# Patient Record
Sex: FEMALE | Employment: UNEMPLOYED | URBAN - METROPOLITAN AREA
[De-identification: names, ages, dates, MRNs, and addresses within clinical notes are randomized per-mention and may not be internally consistent; named-entity substitution may affect disease eponyms.]

---

## 2021-10-04 ENCOUNTER — HOSPITAL ENCOUNTER (OUTPATIENT)
Dept: GENERAL RADIOLOGY | Age: 26
Discharge: HOME | End: 2021-10-04
Attending: INTERNAL MEDICINE
Payer: COMMERCIAL

## 2021-10-04 DIAGNOSIS — M54.12 RADICULOPATHY OF CERVICAL SPINE: ICD-10-CM

## 2021-10-04 PROCEDURE — 72040 X-RAY EXAM NECK SPINE 2-3 VW: CPT | Performed by: INTERNAL MEDICINE

## 2021-10-20 ENCOUNTER — HOSPITAL ENCOUNTER (OUTPATIENT)
Dept: MRI IMAGING | Age: 26
Discharge: HOME | End: 2021-10-20
Attending: INTERNAL MEDICINE
Payer: COMMERCIAL

## 2021-10-20 DIAGNOSIS — M54.12 RADICULOPATHY, CERVICAL: ICD-10-CM

## 2021-10-20 PROCEDURE — 72141 MRI NECK SPINE W/O DYE: CPT | Performed by: INTERNAL MEDICINE

## 2021-11-29 ENCOUNTER — OFFICE VISIT (OUTPATIENT)
Dept: OBGYN CLINIC | Facility: CLINIC | Age: 26
End: 2021-11-29
Payer: COMMERCIAL

## 2021-11-29 ENCOUNTER — LAB ENCOUNTER (OUTPATIENT)
Dept: LAB | Age: 26
End: 2021-11-29
Attending: OBSTETRICS & GYNECOLOGY
Payer: COMMERCIAL

## 2021-11-29 VITALS — WEIGHT: 148 LBS | HEART RATE: 60 BPM | SYSTOLIC BLOOD PRESSURE: 117 MMHG | DIASTOLIC BLOOD PRESSURE: 79 MMHG

## 2021-11-29 DIAGNOSIS — Z11.3 SCREENING FOR STDS (SEXUALLY TRANSMITTED DISEASES): ICD-10-CM

## 2021-11-29 DIAGNOSIS — N92.6 IRREGULAR MENSES: ICD-10-CM

## 2021-11-29 DIAGNOSIS — Z12.4 SCREENING FOR CERVICAL CANCER: ICD-10-CM

## 2021-11-29 DIAGNOSIS — E28.2 PCOS (POLYCYSTIC OVARIAN SYNDROME): Primary | ICD-10-CM

## 2021-11-29 PROCEDURE — 84443 ASSAY THYROID STIM HORMONE: CPT

## 2021-11-29 PROCEDURE — 82670 ASSAY OF TOTAL ESTRADIOL: CPT

## 2021-11-29 PROCEDURE — 83001 ASSAY OF GONADOTROPIN (FSH): CPT

## 2021-11-29 PROCEDURE — 3074F SYST BP LT 130 MM HG: CPT | Performed by: OBSTETRICS & GYNECOLOGY

## 2021-11-29 PROCEDURE — 36415 COLL VENOUS BLD VENIPUNCTURE: CPT

## 2021-11-29 PROCEDURE — 3078F DIAST BP <80 MM HG: CPT | Performed by: OBSTETRICS & GYNECOLOGY

## 2021-11-29 PROCEDURE — 99203 OFFICE O/P NEW LOW 30 MIN: CPT | Performed by: OBSTETRICS & GYNECOLOGY

## 2021-11-29 PROCEDURE — 84144 ASSAY OF PROGESTERONE: CPT

## 2021-11-29 RX ORDER — FEXOFENADINE HCL AND PSEUDOEPHEDRINE HCI 180; 240 MG/1; MG/1
1 TABLET, EXTENDED RELEASE ORAL DAILY
COMMUNITY

## 2021-11-29 NOTE — PROGRESS NOTES
HPI:    Patient ID: Adrian Viramontes is a 27year old year old female.     HPI  GYN consultation  Referred by Dr. Kevan Cates  73-year-old nulligravida female with history of polycystic ovarian syndrome and irregular menses that began 5 years ASSESSMENT/PLAN:    (E28.2) PCOS (polycystic ovarian syndrome)  (primary encounter diagnosis)  Plan: Check hormones    (N92.6) Irregular menses  Plan: ASSAY, THYROID STIM HORMONE, FSH, ESTRADIOL,         PROGESTERONE        As above suspect anovulatory

## 2021-12-01 ENCOUNTER — TELEPHONE (OUTPATIENT)
Dept: OBGYN CLINIC | Facility: CLINIC | Age: 26
End: 2021-12-01

## 2021-12-01 DIAGNOSIS — N97.9 FEMALE INFERTILITY: Primary | ICD-10-CM

## 2021-12-01 NOTE — TELEPHONE ENCOUNTER
----- Message from Alfred Garcia MD sent at 12/1/2021  9:33 AM CST -----  Hormone tests reviewed. So far wnl. Suspect low progesterone, need to check serum progesterone in 2 weeks. She should f/u after her next menses to discuss fertility.

## 2021-12-02 ENCOUNTER — TELEPHONE (OUTPATIENT)
Dept: OBGYN CLINIC | Facility: CLINIC | Age: 26
End: 2021-12-02

## 2021-12-02 NOTE — TELEPHONE ENCOUNTER
Called number ending in [phone] and spouse answered. No verbal release in FYI. Spouse provided new number and this RN called. Phone continues to ring and no answer.  No vm available

## 2021-12-02 NOTE — TELEPHONE ENCOUNTER
Letter sent to pt.    ----- Message from Sanna Mckoy MD sent at 12/2/2021  1:27 PM CST -----  Please notify by MyChart or letter of normal Pap test and normal HPV cotesting.

## 2021-12-03 NOTE — TELEPHONE ENCOUNTER
pts  is returning the nurses call.  states that she only speaks S[place]ome[place], and the pt. Is with him. Roxanne Silva

## 2021-12-03 NOTE — TELEPHONE ENCOUNTER
Nauruan phone line  #[phone] used to translate phone call. Pt called and informed of results and recommendations. Pt voices understanding. Order for progesterone placed. Pt voices she will schedule appointment with ajb after her next menses.

## 2021-12-03 NOTE — TELEPHONE ENCOUNTER
Received pt's . Was advised by pt's  that pt can be reached at [phone].  Pt can be reached after 4pm.

## 2021-12-23 ENCOUNTER — LAB ENCOUNTER (OUTPATIENT)
Dept: LAB | Age: 26
End: 2021-12-23
Attending: OBSTETRICS & GYNECOLOGY
Payer: COMMERCIAL

## 2021-12-23 DIAGNOSIS — N97.9 FEMALE INFERTILITY: ICD-10-CM

## 2021-12-23 PROCEDURE — 84144 ASSAY OF PROGESTERONE: CPT

## 2021-12-23 PROCEDURE — 36415 COLL VENOUS BLD VENIPUNCTURE: CPT

## 2021-12-24 ENCOUNTER — TELEPHONE (OUTPATIENT)
Dept: OBGYN CLINIC | Facility: CLINIC | Age: 26
End: 2021-12-24

## 2021-12-24 NOTE — TELEPHONE ENCOUNTER
----- Message from Germaine Delgadillo MD sent at 12/24/2021 10:20 AM CST -----  Please inform of low progesterone indicating she is not ovulating. Please recommend she come in for appt if she wishes to discuss fertility treatment plan.

## 2021-12-27 NOTE — TELEPHONE ENCOUNTER
Pakistani phone line  #[phone] used to translate phone call. Pt called and informed of results and recommendations. Pt voices understanding, and would like to call the office back to schedule an appointment. Number for office given to the pt.

## 2022-01-11 ENCOUNTER — APPOINTMENT (OUTPATIENT)
Dept: GENERAL RADIOLOGY | Facility: HOSPITAL | Age: 27
End: 2022-01-11
Attending: EMERGENCY MEDICINE
Payer: COMMERCIAL

## 2022-01-11 ENCOUNTER — HOSPITAL ENCOUNTER (EMERGENCY)
Facility: HOSPITAL | Age: 27
Discharge: HOME | End: 2022-01-11
Attending: EMERGENCY MEDICINE
Payer: COMMERCIAL

## 2022-01-11 VITALS
RESPIRATION RATE: 16 BRPM | WEIGHT: 150 LBS | OXYGEN SATURATION: 98 % | BODY MASS INDEX: 25.61 KG/M2 | SYSTOLIC BLOOD PRESSURE: 125 MMHG | HEART RATE: 68 BPM | TEMPERATURE: 97 F | DIASTOLIC BLOOD PRESSURE: 85 MMHG | HEIGHT: 64 IN

## 2022-01-11 DIAGNOSIS — U07.1 COVID-19 VIRUS INFECTION: Primary | ICD-10-CM

## 2022-01-11 PROCEDURE — 71045 X-RAY EXAM CHEST 1 VIEW: CPT | Performed by: EMERGENCY MEDICINE

## 2022-01-11 PROCEDURE — 99283 EMERGENCY DEPT VISIT LOW MDM: CPT

## 2022-01-12 NOTE — ED PROVIDER NOTES
Patient Seen in: [facility] AND [facility] Emergency Department      History   Patient presents with:  Covid    Stated Complaint: covid    Subjective:   HPI    23-year-old female without significant past medical history presents with complaints of persistent cou normal  Neurological: Speech normal.  Moving extremities equally x4. Skin: warm and dry, no rashes. Musculoskeletal: neck is supple non tender        Extremities are symmetrical, full range of motion. No leg edema or tenderness noted.   Psychiatric: sandra

## 2022-02-05 ENCOUNTER — HOSPITAL ENCOUNTER (OUTPATIENT)
Dept: GENERAL RADIOLOGY | Age: 27
Discharge: HOME | End: 2022-02-05
Attending: FAMILY MEDICINE
Payer: COMMERCIAL

## 2022-02-05 ENCOUNTER — HOSPITAL ENCOUNTER (OUTPATIENT)
Dept: CT IMAGING | Age: 27
Discharge: HOME | End: 2022-02-05
Attending: FAMILY MEDICINE
Payer: COMMERCIAL

## 2022-02-05 DIAGNOSIS — U09.9 POST COVID-19 CONDITION, UNSPECIFIED: ICD-10-CM

## 2022-02-05 DIAGNOSIS — R07.9 CHEST PAIN: ICD-10-CM

## 2022-02-05 DIAGNOSIS — R10.9 ABDOMINAL PAIN: ICD-10-CM

## 2022-02-05 DIAGNOSIS — R05.9 COUGH: ICD-10-CM

## 2022-02-05 LAB — CREAT BLD-MCNC: 0.7 MG/DL

## 2022-02-05 PROCEDURE — 71046 X-RAY EXAM CHEST 2 VIEWS: CPT | Performed by: FAMILY MEDICINE

## 2022-02-05 PROCEDURE — 74177 CT ABD & PELVIS W/CONTRAST: CPT | Performed by: FAMILY MEDICINE

## 2022-02-05 PROCEDURE — 82565 ASSAY OF CREATININE: CPT

## 2022-02-11 ENCOUNTER — HOSPITAL ENCOUNTER (EMERGENCY)
Facility: HOSPITAL | Age: 27
Discharge: HOME | End: 2022-02-12
Attending: EMERGENCY MEDICINE
Payer: COMMERCIAL

## 2022-02-11 DIAGNOSIS — R11.2 NAUSEA AND VOMITING IN ADULT: ICD-10-CM

## 2022-02-11 DIAGNOSIS — R10.13 EPIGASTRIC PAIN: Primary | ICD-10-CM

## 2022-02-11 LAB
ANION GAP SERPL CALC-SCNC: 7 MMOL/L (ref 0–18)
B-HCG UR QL: NEGATIVE
BASOPHILS # BLD AUTO: 0.06 X10(3) UL (ref 0–0.2)
BASOPHILS NFR BLD AUTO: 0.6 %
BUN BLD-MCNC: 13 MG/DL (ref 7–18)
BUN/CREAT SERPL: 16.3 (ref 10–20)
CALCIUM BLD-MCNC: 9 MG/DL (ref 8.5–10.1)
CHLORIDE SERPL-SCNC: 105 MMOL/L (ref 98–112)
CO2 SERPL-SCNC: 26 MMOL/L (ref 21–32)
CREAT BLD-MCNC: 0.8 MG/DL
DEPRECATED RDW RBC AUTO: 40.1 FL (ref 35.1–46.3)
EOSINOPHIL # BLD AUTO: 0.08 X10(3) UL (ref 0–0.7)
EOSINOPHIL NFR BLD AUTO: 0.8 %
ERYTHROCYTE [DISTWIDTH] IN BLOOD BY AUTOMATED COUNT: 12.7 % (ref 11–15)
GLUCOSE BLD-MCNC: 80 MG/DL (ref 70–99)
HCT VFR BLD AUTO: 41.4 %
HGB BLD-MCNC: 14.3 G/DL
IMM GRANULOCYTES # BLD AUTO: 0.02 X10(3) UL (ref 0–1)
IMM GRANULOCYTES NFR BLD: 0.2 %
LYMPHOCYTES # BLD AUTO: 2.27 X10(3) UL (ref 1–4)
LYMPHOCYTES NFR BLD AUTO: 24 %
MCH RBC QN AUTO: 30 PG (ref 26–34)
MCHC RBC AUTO-ENTMCNC: 34.5 G/DL (ref 31–37)
MCV RBC AUTO: 86.8 FL
MONOCYTES # BLD AUTO: 0.7 X10(3) UL (ref 0.1–1)
MONOCYTES NFR BLD AUTO: 7.4 %
NEUTROPHILS # BLD AUTO: 6.34 X10 (3) UL (ref 1.5–7.7)
NEUTROPHILS # BLD AUTO: 6.34 X10(3) UL (ref 1.5–7.7)
NEUTROPHILS NFR BLD AUTO: 67 %
OSMOLALITY SERPL CALC.SUM OF ELEC: 285 MOSM/KG (ref 275–295)
PLATELET # BLD AUTO: 225 10(3)UL (ref 150–450)
POTASSIUM SERPL-SCNC: 3.3 MMOL/L (ref 3.5–5.1)
RBC # BLD AUTO: 4.77 X10(6)UL
SODIUM SERPL-SCNC: 138 MMOL/L (ref 136–145)
WBC # BLD AUTO: 9.5 X10(3) UL (ref 4–11)

## 2022-02-11 PROCEDURE — 96374 THER/PROPH/DIAG INJ IV PUSH: CPT

## 2022-02-11 PROCEDURE — 80048 BASIC METABOLIC PNL TOTAL CA: CPT | Performed by: EMERGENCY MEDICINE

## 2022-02-11 PROCEDURE — 81025 URINE PREGNANCY TEST: CPT

## 2022-02-11 PROCEDURE — 96375 TX/PRO/DX INJ NEW DRUG ADDON: CPT

## 2022-02-11 PROCEDURE — 99284 EMERGENCY DEPT VISIT MOD MDM: CPT

## 2022-02-11 PROCEDURE — 85025 COMPLETE CBC W/AUTO DIFF WBC: CPT | Performed by: EMERGENCY MEDICINE

## 2022-02-11 PROCEDURE — 96361 HYDRATE IV INFUSION ADD-ON: CPT

## 2022-02-11 RX ORDER — PANTOPRAZOLE SODIUM 40 MG/1
TABLET, DELAYED RELEASE ORAL
COMMUNITY
Start: 2022-01-18

## 2022-02-11 RX ORDER — AMOXICILLIN 500 MG/1
CAPSULE ORAL
COMMUNITY
Start: 2022-02-01

## 2022-02-11 RX ORDER — ALBUTEROL SULFATE 2.5 MG/3ML
SOLUTION RESPIRATORY (INHALATION)
COMMUNITY
Start: 2022-01-23

## 2022-02-11 RX ORDER — FLUCONAZOLE 150 MG/1
TABLET ORAL
COMMUNITY
Start: 2022-02-01

## 2022-02-11 RX ORDER — ONDANSETRON 2 MG/ML
4 INJECTION INTRAMUSCULAR; INTRAVENOUS ONCE
Status: COMPLETED | OUTPATIENT
Start: 2022-02-11 | End: 2022-02-11

## 2022-02-11 RX ORDER — LORAZEPAM 2 MG/ML
0.5 INJECTION INTRAMUSCULAR ONCE
Status: COMPLETED | OUTPATIENT
Start: 2022-02-11 | End: 2022-02-11

## 2022-02-11 RX ORDER — FAMOTIDINE 20 MG/1
20 TABLET, FILM COATED ORAL ONCE
Status: COMPLETED | OUTPATIENT
Start: 2022-02-11 | End: 2022-02-11

## 2022-02-11 RX ORDER — ALPRAZOLAM 0.25 MG/1
TABLET ORAL
COMMUNITY
Start: 2022-01-18

## 2022-02-11 RX ORDER — METOCLOPRAMIDE 10 MG/1
10 TABLET ORAL
COMMUNITY

## 2022-02-11 RX ORDER — MAGNESIUM HYDROXIDE/ALUMINUM HYDROXICE/SIMETHICONE 120; 1200; 1200 MG/30ML; MG/30ML; MG/30ML
30 SUSPENSION ORAL ONCE
Status: COMPLETED | OUTPATIENT
Start: 2022-02-11 | End: 2022-02-11

## 2022-02-11 RX ORDER — ONDANSETRON 4 MG/1
4 TABLET, ORALLY DISINTEGRATING ORAL ONCE
Status: COMPLETED | OUTPATIENT
Start: 2022-02-11 | End: 2022-02-11

## 2022-02-11 RX ORDER — DICYCLOMINE HCL 20 MG
20 TABLET ORAL 4 TIMES DAILY PRN
Qty: 30 TABLET | Refills: 0 | Status: SHIPPED | OUTPATIENT
Start: 2022-02-11

## 2022-02-11 RX ORDER — PROMETHAZINE HYDROCHLORIDE AND CODEINE PHOSPHATE 6.25; 1 MG/5ML; MG/5ML
SOLUTION ORAL
COMMUNITY
Start: 2022-01-18

## 2022-02-11 RX ORDER — AZITHROMYCIN 250 MG/1
TABLET, FILM COATED ORAL
COMMUNITY
Start: 2022-01-14

## 2022-02-11 RX ORDER — ONDANSETRON 4 MG/1
4 TABLET, ORALLY DISINTEGRATING ORAL EVERY 4 HOURS PRN
Qty: 15 TABLET | Refills: 0 | Status: SHIPPED | OUTPATIENT
Start: 2022-02-11

## 2022-02-11 RX ORDER — SUCRALFATE 1 G/1
1 TABLET ORAL
COMMUNITY

## 2022-02-11 RX ORDER — CEPHALEXIN 500 MG/1
CAPSULE ORAL
COMMUNITY
Start: 2022-01-25

## 2022-02-12 VITALS
WEIGHT: 140 LBS | BODY MASS INDEX: 24 KG/M2 | TEMPERATURE: 99 F | DIASTOLIC BLOOD PRESSURE: 69 MMHG | HEART RATE: 58 BPM | SYSTOLIC BLOOD PRESSURE: 100 MMHG | RESPIRATION RATE: 16 BRPM | OXYGEN SATURATION: 97 %

## 2022-02-12 RX ORDER — DICYCLOMINE HCL 20 MG
20 TABLET ORAL 4 TIMES DAILY PRN
Qty: 30 TABLET | Refills: 0 | Status: SHIPPED | OUTPATIENT
Start: 2022-02-12

## 2022-02-12 RX ORDER — ONDANSETRON 4 MG/1
4 TABLET, ORALLY DISINTEGRATING ORAL EVERY 4 HOURS PRN
Qty: 15 TABLET | Refills: 0 | Status: SHIPPED | OUTPATIENT
Start: 2022-02-12

## 2022-02-12 NOTE — ED INITIAL ASSESSMENT (HPI)
Pt c/o abd pain x1 wk, seen by MD and rx'd sucralfate and metoclopramide. Now having N/V. Unable to tolerate PO intake.

## 2022-04-11 ENCOUNTER — ORDER TRANSCRIPTION (OUTPATIENT)
Dept: ADMINISTRATIVE | Facility: HOSPITAL | Age: 27
End: 2022-04-11

## 2022-05-31 ENCOUNTER — HOSPITAL ENCOUNTER (OUTPATIENT)
Dept: GENERAL RADIOLOGY | Age: 27
Discharge: HOME | End: 2022-05-31
Attending: ORTHOPAEDIC SURGERY
Payer: COMMERCIAL

## 2022-05-31 DIAGNOSIS — M19.90 OSTEOARTHRITIS: ICD-10-CM

## 2022-05-31 PROCEDURE — 73630 X-RAY EXAM OF FOOT: CPT | Performed by: ORTHOPAEDIC SURGERY

## 2022-06-27 ENCOUNTER — OFFICE VISIT (OUTPATIENT)
Dept: OBGYN CLINIC | Facility: CLINIC | Age: 27
End: 2022-06-27
Payer: COMMERCIAL

## 2022-06-27 VITALS
HEART RATE: 65 BPM | WEIGHT: 135 LBS | BODY MASS INDEX: 23 KG/M2 | DIASTOLIC BLOOD PRESSURE: 80 MMHG | SYSTOLIC BLOOD PRESSURE: 122 MMHG

## 2022-06-27 DIAGNOSIS — E28.2 PCOS (POLYCYSTIC OVARIAN SYNDROME): ICD-10-CM

## 2022-06-27 DIAGNOSIS — N92.6 IRREGULAR MENSES: Primary | ICD-10-CM

## 2022-06-27 LAB
CONTROL LINE PRESENT WITH A CLEAR BACKGROUND (YES/NO): YES YES/NO
PREGNANCY TEST, URINE: NEGATIVE

## 2022-06-27 RX ORDER — VITAMIN A ACETATE, BETA CAROTENE, ASCORBIC ACID, CHOLECALCIFEROL, .ALPHA.-TOCOPHEROL ACETATE, DL-, THIAMINE MONONITRATE, RIBOFLAVIN, NIACINAMIDE, PYRIDOXINE HYDROCHLORIDE, FOLIC ACID, CYANOCOBALAMIN, CALCIUM CARBONATE, FERROUS FUMARATE, ZINC OXIDE, CUPRIC OXIDE 3080; 12; 120; 400; 1; 1.84; 3; 20; 22; 920; 25; 200; 27; 10; 2 [IU]/1; UG/1; MG/1; [IU]/1; MG/1; MG/1; MG/1; MG/1; MG/1; [IU]/1; MG/1; MG/1; MG/1; MG/1; MG/1
1 TABLET, FILM COATED ORAL DAILY
Qty: 90 TABLET | Refills: 3 | Status: SHIPPED | OUTPATIENT
Start: 2022-06-27 | End: 2022-07-27

## 2022-06-27 RX ORDER — DROSPIRENONE AND ETHINYL ESTRADIOL 0.03MG-3MG
1 KIT ORAL DAILY
Qty: 84 TABLET | Refills: 0 | Status: SHIPPED | OUTPATIENT
Start: 2022-06-27

## 2022-07-18 ENCOUNTER — OFFICE VISIT (OUTPATIENT)
Dept: RHEUMATOLOGY | Facility: CLINIC | Age: 27
End: 2022-07-18
Payer: COMMERCIAL

## 2022-07-18 VITALS
DIASTOLIC BLOOD PRESSURE: 81 MMHG | SYSTOLIC BLOOD PRESSURE: 120 MMHG | WEIGHT: 138.88 LBS | RESPIRATION RATE: 16 BRPM | HEIGHT: 64 IN | HEART RATE: 62 BPM | BODY MASS INDEX: 23.71 KG/M2

## 2022-07-18 DIAGNOSIS — M54.50 CHRONIC BILATERAL LOW BACK PAIN WITHOUT SCIATICA: ICD-10-CM

## 2022-07-18 DIAGNOSIS — G89.29 CHRONIC BILATERAL LOW BACK PAIN WITHOUT SCIATICA: ICD-10-CM

## 2022-07-18 DIAGNOSIS — E55.9 VITAMIN D DEFICIENCY: ICD-10-CM

## 2022-07-18 DIAGNOSIS — M54.2 NECK PAIN: ICD-10-CM

## 2022-07-18 DIAGNOSIS — M79.18 MYOFASCIAL PAIN: Primary | ICD-10-CM

## 2022-07-18 DIAGNOSIS — M25.50 POLYARTHRALGIA: ICD-10-CM

## 2022-07-18 PROCEDURE — 3079F DIAST BP 80-89 MM HG: CPT | Performed by: INTERNAL MEDICINE

## 2022-07-18 PROCEDURE — 3074F SYST BP LT 130 MM HG: CPT | Performed by: INTERNAL MEDICINE

## 2022-07-18 PROCEDURE — 99204 OFFICE O/P NEW MOD 45 MIN: CPT | Performed by: INTERNAL MEDICINE

## 2022-07-18 PROCEDURE — 3008F BODY MASS INDEX DOCD: CPT | Performed by: INTERNAL MEDICINE

## 2022-07-18 RX ORDER — DULOXETIN HYDROCHLORIDE 20 MG/1
20 CAPSULE, DELAYED RELEASE ORAL DAILY
Qty: 30 CAPSULE | Refills: 1 | Status: SHIPPED | OUTPATIENT
Start: 2022-07-18

## 2022-09-06 RX ORDER — DROSPIRENONE AND ETHINYL ESTRADIOL 0.03MG-3MG
1 KIT ORAL DAILY
Qty: 84 TABLET | Refills: 0 | Status: SHIPPED | OUTPATIENT
Start: 2022-09-06

## 2022-09-26 ENCOUNTER — OFFICE VISIT (OUTPATIENT)
Dept: OBGYN CLINIC | Facility: CLINIC | Age: 27
End: 2022-09-26

## 2022-09-26 VITALS — WEIGHT: 142.13 LBS | BODY MASS INDEX: 24 KG/M2 | SYSTOLIC BLOOD PRESSURE: 125 MMHG | DIASTOLIC BLOOD PRESSURE: 84 MMHG

## 2022-09-26 DIAGNOSIS — N92.6 IRREGULAR MENSES: Primary | ICD-10-CM

## 2022-09-26 PROCEDURE — 99213 OFFICE O/P EST LOW 20 MIN: CPT | Performed by: OBSTETRICS & GYNECOLOGY

## 2022-09-26 PROCEDURE — 3074F SYST BP LT 130 MM HG: CPT | Performed by: OBSTETRICS & GYNECOLOGY

## 2022-09-26 PROCEDURE — 3079F DIAST BP 80-89 MM HG: CPT | Performed by: OBSTETRICS & GYNECOLOGY

## 2022-09-26 RX ORDER — OMEPRAZOLE 20 MG/1
20 CAPSULE, DELAYED RELEASE ORAL
COMMUNITY

## 2022-09-26 RX ORDER — DROSPIRENONE AND ETHINYL ESTRADIOL 0.03MG-3MG
1 KIT ORAL DAILY
Qty: 56 TABLET | Refills: 0 | Status: SHIPPED | OUTPATIENT
Start: 2022-09-26

## 2022-12-01 ENCOUNTER — OFFICE VISIT (OUTPATIENT)
Dept: OBGYN CLINIC | Facility: CLINIC | Age: 27
End: 2022-12-01
Payer: COMMERCIAL

## 2022-12-01 VITALS — BODY MASS INDEX: 25 KG/M2 | WEIGHT: 146 LBS | DIASTOLIC BLOOD PRESSURE: 86 MMHG | SYSTOLIC BLOOD PRESSURE: 128 MMHG

## 2022-12-01 DIAGNOSIS — Z01.419 WOMEN'S ANNUAL ROUTINE GYNECOLOGICAL EXAMINATION: Primary | ICD-10-CM

## 2022-12-01 DIAGNOSIS — Z11.3 SCREENING FOR STDS (SEXUALLY TRANSMITTED DISEASES): ICD-10-CM

## 2022-12-01 PROCEDURE — 3079F DIAST BP 80-89 MM HG: CPT | Performed by: OBSTETRICS & GYNECOLOGY

## 2022-12-01 PROCEDURE — 99395 PREV VISIT EST AGE 18-39: CPT | Performed by: OBSTETRICS & GYNECOLOGY

## 2022-12-01 PROCEDURE — 3074F SYST BP LT 130 MM HG: CPT | Performed by: OBSTETRICS & GYNECOLOGY

## 2022-12-02 LAB
C TRACH DNA SPEC QL NAA+PROBE: NEGATIVE
HPV I/H RISK 1 DNA SPEC QL NAA+PROBE: NEGATIVE
N GONORRHOEA DNA SPEC QL NAA+PROBE: NEGATIVE

## 2022-12-08 ENCOUNTER — TELEPHONE (OUTPATIENT)
Dept: OBGYN CLINIC | Facility: CLINIC | Age: 27
End: 2022-12-08

## 2022-12-08 LAB — LAST PAP RESULT: NORMAL

## 2022-12-08 NOTE — TELEPHONE ENCOUNTER
----- Message from Gui Bullock MD sent at 12/8/2022  3:00 PM CST -----  Please notify by MyChart or letter of normal Pap test and normal HPV cotesting.

## 2023-02-13 ENCOUNTER — OFFICE VISIT (OUTPATIENT)
Dept: RHEUMATOLOGY | Facility: CLINIC | Age: 28
End: 2023-02-13

## 2023-02-13 VITALS
HEIGHT: 64 IN | BODY MASS INDEX: 26.12 KG/M2 | DIASTOLIC BLOOD PRESSURE: 85 MMHG | WEIGHT: 153 LBS | SYSTOLIC BLOOD PRESSURE: 126 MMHG | HEART RATE: 96 BPM | RESPIRATION RATE: 16 BRPM

## 2023-02-13 DIAGNOSIS — M54.2 NECK PAIN: ICD-10-CM

## 2023-02-13 DIAGNOSIS — M79.18 MYOFASCIAL PAIN: Primary | ICD-10-CM

## 2023-02-13 PROCEDURE — 3074F SYST BP LT 130 MM HG: CPT | Performed by: INTERNAL MEDICINE

## 2023-02-13 PROCEDURE — 3079F DIAST BP 80-89 MM HG: CPT | Performed by: INTERNAL MEDICINE

## 2023-02-13 PROCEDURE — 3008F BODY MASS INDEX DOCD: CPT | Performed by: INTERNAL MEDICINE

## 2023-02-13 PROCEDURE — 99214 OFFICE O/P EST MOD 30 MIN: CPT | Performed by: INTERNAL MEDICINE

## 2023-02-13 RX ORDER — DULOXETIN HYDROCHLORIDE 60 MG/1
60 CAPSULE, DELAYED RELEASE ORAL DAILY
Qty: 30 CAPSULE | Refills: 3 | Status: SHIPPED | OUTPATIENT
Start: 2023-02-13

## 2023-02-13 NOTE — PATIENT INSTRUCTIONS
1. Increase dulxoetine 20mg two tablets a day x 1 week, then start duloxetine 60mg a day   2. Return to clinic in 2 months.

## 2023-06-26 RX ORDER — DULOXETIN HYDROCHLORIDE 60 MG/1
60 CAPSULE, DELAYED RELEASE ORAL DAILY
Qty: 30 CAPSULE | Refills: 1 | Status: SHIPPED | OUTPATIENT
Start: 2023-06-26

## 2023-06-26 NOTE — TELEPHONE ENCOUNTER
LOV: 2/13/23  Last Refilled:#30, 3rfs 2/13/23    Future Appointments   Date Time Provider Eugenio Engle   7/13/2023  2:40 PM Blessing Dallas MD [facility]     Summary:  1. Increase dulxoetine 20mg two tablets a day x 1 week, then start duloxetine 60mg a day   2. Return to clinic in 2 months. Rosa Elena Frankel MD  2/13/2023   10:55 AM  - [place]ed [place]  through Epic                   Electronically signed by Antoinette Roach MD at 2/13/2023 12:16 PM  Please advise.

## 2023-07-13 ENCOUNTER — OFFICE VISIT (OUTPATIENT)
Dept: OBGYN CLINIC | Facility: CLINIC | Age: 28
End: 2023-07-13

## 2023-07-13 VITALS — HEIGHT: 64 IN | WEIGHT: 147.63 LBS | BODY MASS INDEX: 25.2 KG/M2

## 2023-07-13 DIAGNOSIS — N89.8 VAGINAL DISCHARGE: Primary | ICD-10-CM

## 2023-07-13 DIAGNOSIS — N92.6 MISSED MENSES: ICD-10-CM

## 2023-07-13 LAB
CONTROL LINE PRESENT WITH A CLEAR BACKGROUND (YES/NO): YES YES/NO
KIT LOT #: NORMAL NUMERIC
PREGNANCY TEST, URINE: NEGATIVE

## 2023-07-13 PROCEDURE — 81025 URINE PREGNANCY TEST: CPT | Performed by: OBSTETRICS & GYNECOLOGY

## 2023-07-13 PROCEDURE — 99213 OFFICE O/P EST LOW 20 MIN: CPT | Performed by: OBSTETRICS & GYNECOLOGY

## 2023-07-13 PROCEDURE — 3008F BODY MASS INDEX DOCD: CPT | Performed by: OBSTETRICS & GYNECOLOGY

## 2023-07-13 RX ORDER — CETIRIZINE HYDROCHLORIDE 1 MG/ML
5 SOLUTION ORAL DAILY
COMMUNITY

## 2023-07-13 RX ORDER — MEDROXYPROGESTERONE ACETATE 10 MG/1
TABLET ORAL
Qty: 10 TABLET | Refills: 0 | Status: SHIPPED | OUTPATIENT
Start: 2023-07-13

## 2023-07-15 LAB
GENITAL VAGINOSIS SCREEN: NEGATIVE
TRICHOMONAS SCREEN: NEGATIVE

## 2023-07-18 ENCOUNTER — TELEPHONE (OUTPATIENT)
Dept: OBGYN CLINIC | Facility: CLINIC | Age: 28
End: 2023-07-18

## 2023-07-18 RX ORDER — FLUCONAZOLE 150 MG/1
150 TABLET ORAL
Qty: 2 TABLET | Refills: 0 | Status: SHIPPED | OUTPATIENT
Start: 2023-07-18

## 2023-07-18 NOTE — TELEPHONE ENCOUNTER
Patient informed of + for yeast infection. Patient would like Diflucan 150 mg tablet by mouth with a refill for one additional tablet which can be taken after 3 days, sent to her pharmacy.

## 2023-07-18 NOTE — TELEPHONE ENCOUNTER
----- Message from Jessenia Alvarez MD sent at 7/17/2023  7:52 AM CDT -----  Please inform that testing shows patient has a vaginal yeast infection. Recommended treatment options include either vaginal creams or oral tablets. If she desires vaginal cream, she can use OTC Monistat 7 vaginal cream, one applicatorful vaginally for 7 days. If she desires an oral pill, she can be prescribed Diflucan 150 mg tablet by mouth once. A refill for one additional tablet which can be taken after 3 days. If she has significant external vulvar itching, we can also prescribe Lotrisone vaginal cream- apply externally BID, 45 gm tube. No refills.

## 2023-08-11 ENCOUNTER — HOSPITAL ENCOUNTER (EMERGENCY)
Facility: HOSPITAL | Age: 28
Discharge: HOME | End: 2023-08-11
Attending: EMERGENCY MEDICINE
Payer: COMMERCIAL

## 2023-08-11 VITALS
HEART RATE: 75 BPM | RESPIRATION RATE: 16 BRPM | BODY MASS INDEX: 24.62 KG/M2 | HEIGHT: 64.57 IN | SYSTOLIC BLOOD PRESSURE: 117 MMHG | OXYGEN SATURATION: 100 % | TEMPERATURE: 99 F | WEIGHT: 146 LBS | DIASTOLIC BLOOD PRESSURE: 70 MMHG

## 2023-08-11 DIAGNOSIS — B34.9 VIRAL SYNDROME: Primary | ICD-10-CM

## 2023-08-11 LAB
ALBUMIN SERPL-MCNC: 3.9 G/DL (ref 3.4–5)
ALP LIVER SERPL-CCNC: 72 U/L
ALT SERPL-CCNC: 37 U/L
ANION GAP SERPL CALC-SCNC: 6 MMOL/L (ref 0–18)
AST SERPL-CCNC: 25 U/L (ref 15–37)
BASOPHILS # BLD AUTO: 0.03 X10(3) UL (ref 0–0.2)
BASOPHILS NFR BLD AUTO: 0.4 %
BILIRUB DIRECT SERPL-MCNC: 0.1 MG/DL (ref 0–0.2)
BILIRUB SERPL-MCNC: 0.5 MG/DL (ref 0.1–2)
BILIRUB UR QL: NEGATIVE
BUN BLD-MCNC: 7 MG/DL (ref 7–18)
BUN/CREAT SERPL: 8.5 (ref 10–20)
CALCIUM BLD-MCNC: 8.8 MG/DL (ref 8.5–10.1)
CHLORIDE SERPL-SCNC: 109 MMOL/L (ref 98–112)
CLARITY UR: CLEAR
CO2 SERPL-SCNC: 23 MMOL/L (ref 21–32)
CREAT BLD-MCNC: 0.82 MG/DL
DEPRECATED RDW RBC AUTO: 45 FL (ref 35.1–46.3)
EGFRCR SERPLBLD CKD-EPI 2021: 99 ML/MIN/1.73M2 (ref 60–?)
EOSINOPHIL # BLD AUTO: 0.02 X10(3) UL (ref 0–0.7)
EOSINOPHIL NFR BLD AUTO: 0.3 %
ERYTHROCYTE [DISTWIDTH] IN BLOOD BY AUTOMATED COUNT: 14.5 % (ref 11–15)
GLUCOSE BLD-MCNC: 96 MG/DL (ref 70–99)
GLUCOSE UR-MCNC: NORMAL MG/DL
HCT VFR BLD AUTO: 39.6 %
HGB BLD-MCNC: 13.2 G/DL
HGB UR QL STRIP.AUTO: NEGATIVE
IMM GRANULOCYTES # BLD AUTO: 0.01 X10(3) UL (ref 0–1)
IMM GRANULOCYTES NFR BLD: 0.1 %
LEUKOCYTE ESTERASE UR QL STRIP.AUTO: NEGATIVE
LYMPHOCYTES # BLD AUTO: 1.04 X10(3) UL (ref 1–4)
LYMPHOCYTES NFR BLD AUTO: 13.8 %
MCH RBC QN AUTO: 28.4 PG (ref 26–34)
MCHC RBC AUTO-ENTMCNC: 33.3 G/DL (ref 31–37)
MCV RBC AUTO: 85.2 FL
MONOCYTES # BLD AUTO: 0.52 X10(3) UL (ref 0.1–1)
MONOCYTES NFR BLD AUTO: 6.9 %
NEUTROPHILS # BLD AUTO: 5.89 X10 (3) UL (ref 1.5–7.7)
NEUTROPHILS # BLD AUTO: 5.89 X10(3) UL (ref 1.5–7.7)
NEUTROPHILS NFR BLD AUTO: 78.5 %
NITRITE UR QL STRIP.AUTO: NEGATIVE
OSMOLALITY SERPL CALC.SUM OF ELEC: 284 MOSM/KG (ref 275–295)
PH UR: 6.5 [PH] (ref 5–8)
PLATELET # BLD AUTO: 198 10(3)UL (ref 150–450)
POTASSIUM SERPL-SCNC: 3.5 MMOL/L (ref 3.5–5.1)
PROT SERPL-MCNC: 8.1 G/DL (ref 6.4–8.2)
PROT UR-MCNC: NEGATIVE MG/DL
RBC # BLD AUTO: 4.65 X10(6)UL
SODIUM SERPL-SCNC: 138 MMOL/L (ref 136–145)
SP GR UR STRIP: 1.01 (ref 1–1.03)
UROBILINOGEN UR STRIP-ACNC: NORMAL
WBC # BLD AUTO: 7.5 X10(3) UL (ref 4–11)

## 2023-08-11 PROCEDURE — 85025 COMPLETE CBC W/AUTO DIFF WBC: CPT | Performed by: EMERGENCY MEDICINE

## 2023-08-11 PROCEDURE — 99284 EMERGENCY DEPT VISIT MOD MDM: CPT

## 2023-08-11 PROCEDURE — 80076 HEPATIC FUNCTION PANEL: CPT | Performed by: EMERGENCY MEDICINE

## 2023-08-11 PROCEDURE — 80048 BASIC METABOLIC PNL TOTAL CA: CPT | Performed by: EMERGENCY MEDICINE

## 2023-08-11 PROCEDURE — 96360 HYDRATION IV INFUSION INIT: CPT

## 2023-08-11 RX ORDER — FAMOTIDINE 20 MG/1
20 TABLET, FILM COATED ORAL 2 TIMES DAILY PRN
Qty: 30 TABLET | Refills: 0 | Status: SHIPPED | OUTPATIENT
Start: 2023-08-11 | End: 2023-09-10

## 2023-08-11 RX ORDER — ACETAMINOPHEN 500 MG
500 TABLET ORAL EVERY 4 HOURS PRN
Qty: 100 TABLET | Refills: 0 | Status: SHIPPED | OUTPATIENT
Start: 2023-08-11 | End: 2023-08-18

## 2023-08-12 NOTE — ED INITIAL ASSESSMENT (HPI)
Pt c/o for the past 2 days fevers on and off (101.5 highest) along with mid abdomen pain. Patient is 4 weeks pregnant. Patient states denies any discharge s&s. Patient states urinary incontinence episodes that come and go. Denies dysuria or foul smelling urine. Pt denies vomiting. No medication taken today per pt.      Patient also c/o sore throat <<<<

## 2023-08-15 ENCOUNTER — OFFICE VISIT (OUTPATIENT)
Dept: OBGYN CLINIC | Facility: CLINIC | Age: 28
End: 2023-08-15

## 2023-08-15 VITALS
BODY MASS INDEX: 24.59 KG/M2 | DIASTOLIC BLOOD PRESSURE: 77 MMHG | WEIGHT: 144 LBS | HEIGHT: 64 IN | SYSTOLIC BLOOD PRESSURE: 117 MMHG

## 2023-08-15 DIAGNOSIS — Z32.01 PREGNANCY EXAMINATION OR TEST, POSITIVE RESULT: Primary | ICD-10-CM

## 2023-08-15 DIAGNOSIS — N92.6 MISSED MENSES: ICD-10-CM

## 2023-08-15 LAB
CONTROL LINE PRESENT WITH A CLEAR BACKGROUND (YES/NO): YES YES/NO
KIT LOT #: NORMAL NUMERIC
PREGNANCY TEST, URINE: POSITIVE

## 2023-08-15 PROCEDURE — 3078F DIAST BP <80 MM HG: CPT | Performed by: OBSTETRICS & GYNECOLOGY

## 2023-08-15 PROCEDURE — 3008F BODY MASS INDEX DOCD: CPT | Performed by: OBSTETRICS & GYNECOLOGY

## 2023-08-15 PROCEDURE — 3074F SYST BP LT 130 MM HG: CPT | Performed by: OBSTETRICS & GYNECOLOGY

## 2023-08-15 PROCEDURE — 81025 URINE PREGNANCY TEST: CPT | Performed by: OBSTETRICS & GYNECOLOGY

## 2023-08-28 ENCOUNTER — ROUTINE PRENATAL (OUTPATIENT)
Dept: OBGYN CLINIC | Facility: CLINIC | Age: 28
End: 2023-08-28

## 2023-08-28 VITALS
DIASTOLIC BLOOD PRESSURE: 76 MMHG | HEART RATE: 75 BPM | SYSTOLIC BLOOD PRESSURE: 114 MMHG | WEIGHT: 144 LBS | BODY MASS INDEX: 25 KG/M2

## 2023-08-28 DIAGNOSIS — Z34.81 ENCOUNTER FOR SUPERVISION OF OTHER NORMAL PREGNANCY IN FIRST TRIMESTER: Primary | ICD-10-CM

## 2023-08-28 PROCEDURE — 3074F SYST BP LT 130 MM HG: CPT | Performed by: STUDENT IN AN ORGANIZED HEALTH CARE EDUCATION/TRAINING PROGRAM

## 2023-08-28 PROCEDURE — 3078F DIAST BP <80 MM HG: CPT | Performed by: STUDENT IN AN ORGANIZED HEALTH CARE EDUCATION/TRAINING PROGRAM

## 2023-08-28 RX ORDER — .BETA.-CAROTENE, ASCORBIC ACID, CHOLECALCIFEROL, .ALPHA.-TOCOPHEROL ACETATE, DL-, THIAMINE, RIBOFLAVIN, NIACINAMIDE, PYRIDOXINE HYDROCHLORIDE, FOLIC ACID, CYANOCOBALAMIN, CALCIUM PANTOTHENATE, CALCIUM CARBONATE, FERROUS FUMARATE, ZINC OXIDE AND DOCUSATE SODIUM 1000; 100; 400; 30; 3; 3; 15; 20; 1; 12; 7; 200; 29; 20; 25 [IU]/1; MG/1; [IU]/1; MG/1; MG/1; MG/1; MG/1; MG/1; MG/1; UG/1; MG/1; MG/1; MG/1; MG/1; MG/1
TABLET ORAL
COMMUNITY
Start: 2023-08-04

## 2023-08-28 NOTE — PROGRESS NOTES
Pt presenting today due to concerns of URI. She saw her PCP 8/25 and was tested for COVID and and strep which resulted negative. She is still experiencing cough and congestion. Report chills but no fevers. No SOB. She is only about 5-6 weeks pregnant. Scheduled for viability scan with KIRSTEN on 9/11. Lung sounds clear to auscultation. Supportive measures encouraged such as honey, humidifier, and cough drops. She may also take zyrtec daily, tylenol 1g q6 hours for pain/fever, and fluticasone nasal spray for congestion. She should seek immediate medical attention if she starts to experience worsening of symptoms, fever, or SOB. All questions answered.

## 2023-09-20 ENCOUNTER — TELEPHONE (OUTPATIENT)
Dept: OBGYN CLINIC | Facility: CLINIC | Age: 28
End: 2023-09-20

## 2023-09-20 NOTE — TELEPHONE ENCOUNTER
Patient 19w0d by LMP has not had an ultrasound. Was recommended to come in for ultrasound 1-2 weeks after 08/15 appt and cancelled ultrasound appt scheduled on 9/11. Did not reach back out until today. Patient scheduled 10/2. Aware of scheduling details.

## 2023-10-02 ENCOUNTER — INITIAL PRENATAL (OUTPATIENT)
Dept: OBGYN CLINIC | Facility: CLINIC | Age: 28
End: 2023-10-02

## 2023-10-02 VITALS
WEIGHT: 146 LBS | DIASTOLIC BLOOD PRESSURE: 76 MMHG | SYSTOLIC BLOOD PRESSURE: 113 MMHG | HEART RATE: 69 BPM | BODY MASS INDEX: 25 KG/M2

## 2023-10-02 DIAGNOSIS — Z34.82 ENCOUNTER FOR SUPERVISION OF OTHER NORMAL PREGNANCY IN SECOND TRIMESTER: Primary | ICD-10-CM

## 2023-10-02 LAB
APPEARANCE: CLEAR
BILIRUBIN: NEGATIVE
GLUCOSE (URINE DIPSTICK): NEGATIVE MG/DL
KETONES (URINE DIPSTICK): NEGATIVE MG/DL
LEUKOCYTES: NEGATIVE
MULTISTIX LOT#: NORMAL NUMERIC
NITRITE, URINE: NEGATIVE
OCCULT BLOOD: NEGATIVE
PH, URINE: 7 (ref 4.5–8)
PROTEIN (URINE DIPSTICK): NEGATIVE MG/DL
SPECIFIC GRAVITY: 1.02 (ref 1–1.03)
URINE-COLOR: YELLOW
UROBILINOGEN,SEMI-QN: 0.2 MG/DL (ref 0–1.9)

## 2023-10-02 PROCEDURE — 81002 URINALYSIS NONAUTO W/O SCOPE: CPT | Performed by: OBSTETRICS & GYNECOLOGY

## 2023-10-02 PROCEDURE — 3078F DIAST BP <80 MM HG: CPT | Performed by: OBSTETRICS & GYNECOLOGY

## 2023-10-02 PROCEDURE — 3074F SYST BP LT 130 MM HG: CPT | Performed by: OBSTETRICS & GYNECOLOGY

## 2023-10-03 LAB
C TRACH DNA SPEC QL NAA+PROBE: NEGATIVE
N GONORRHOEA DNA SPEC QL NAA+PROBE: NEGATIVE

## 2023-10-06 RX ORDER — VITAMIN A, VITAMIN C, VITAMIN D, VITAMIN E, THIAMINE, RIBOFLAVIN, NIACIN, VITAMIN B6, FOLIC ACID, VITAMIN B12, CALCIUM, IRON, ZINC, COPPER 4000; 120; 400; 22; 1.84; 3; 20; 10; 1; 12; 200; 27; 25; 2 [IU]/1; MG/1; [IU]/1; [IU]/1; MG/1; MG/1; MG/1; MG/1; MG/1; UG/1; MG/1; MG/1; MG/1; MG/1
1 TABLET ORAL DAILY
Qty: 90 TABLET | Refills: 3 | Status: SHIPPED | OUTPATIENT
Start: 2023-10-06

## 2023-10-09 ENCOUNTER — NURSE ONLY (OUTPATIENT)
Dept: OBGYN CLINIC | Facility: CLINIC | Age: 28
End: 2023-10-09

## 2023-10-09 DIAGNOSIS — Z34.82 ENCOUNTER FOR SUPERVISION OF OTHER NORMAL PREGNANCY IN SECOND TRIMESTER: Primary | ICD-10-CM

## 2023-10-09 NOTE — PROGRESS NOTES
Pt Name and  verified. OB History     T0    L0    SAB0  IAB0  Ectopic0  Multiple0  Live Births0     Pt called today for RN [facility] Education. Missed menses apt with: KIRSTEN   LMP: 05/10/2023    Pre  BMI: 24.71   EPDS score: 0/30    US: in office with KIRSTEN 10/2/2023  Working [employer]: 2024   Hx of genetic abnormality in family: none   Hx of varicella: unsure if she did   Flu Vaccine: would like to receive. Covid Vaccine: is vaccinated. Sterilization/Contraception: unsure and will think about it. OUD Screening:  Pt. Has answered NO 5P questions and has NO  risk factors. Pt. Given What pregnant women need to know handout. SDOH completed, jhoan dillard nurse navigator referral placed per pt as she is stressed due to housing issues currently. Housing information to be provided to pt [facility] or [facility]  [facility] her next [facility]. Pt aware and VU. Educational material reviewed with patient: Prenatal care, nutrition, weight gain recommendations, travel, exercise, intercourse, pregnancy changes, safe medications, pregnancy and work, fetal movement, labor and  labor, warning signs, food safety, tdap, cord blood, breastfeeding and bottle feeding if necessary, circumcision no, and Group B strep. Blood transfusion if needed:  yes, if needed in PN care. PN labs: pt to complete. Optional genetic screening labs were reviewed: Angeles Cornell, FTS with US, Quad screen MSAFP and CF screening. Declines genetic testing at this time.      [facility] Policy: reviewed and VU     NOB apt: with KIRSTEN

## 2023-10-30 ENCOUNTER — ROUTINE PRENATAL (OUTPATIENT)
Dept: OBGYN CLINIC | Facility: CLINIC | Age: 28
End: 2023-10-30

## 2023-10-30 ENCOUNTER — LAB ENCOUNTER (OUTPATIENT)
Dept: LAB | Age: 28
End: 2023-10-30
Attending: OBSTETRICS & GYNECOLOGY
Payer: COMMERCIAL

## 2023-10-30 VITALS
WEIGHT: 153 LBS | BODY MASS INDEX: 26 KG/M2 | HEART RATE: 82 BPM | SYSTOLIC BLOOD PRESSURE: 119 MMHG | DIASTOLIC BLOOD PRESSURE: 73 MMHG

## 2023-10-30 DIAGNOSIS — Z34.82 ENCOUNTER FOR SUPERVISION OF OTHER NORMAL PREGNANCY IN SECOND TRIMESTER: ICD-10-CM

## 2023-10-30 DIAGNOSIS — Z34.00 SUPERVISION OF NORMAL FIRST PREGNANCY, ANTEPARTUM: Primary | ICD-10-CM

## 2023-10-30 PROBLEM — Z01.419 WOMEN'S ANNUAL ROUTINE GYNECOLOGICAL EXAMINATION: Status: RESOLVED | Noted: 2022-12-01 | Resolved: 2023-10-30

## 2023-10-30 LAB
ANTIBODY SCREEN: NEGATIVE
BASOPHILS # BLD AUTO: 0.04 X10(3) UL (ref 0–0.2)
BASOPHILS NFR BLD AUTO: 0.4 %
BILIRUB UR QL: NEGATIVE
CLARITY UR: CLEAR
DEPRECATED HBV CORE AB SER IA-ACNC: 17.8 NG/ML
DEPRECATED RDW RBC AUTO: 46 FL (ref 35.1–46.3)
EOSINOPHIL # BLD AUTO: 0.13 X10(3) UL (ref 0–0.7)
EOSINOPHIL NFR BLD AUTO: 1.4 %
ERYTHROCYTE [DISTWIDTH] IN BLOOD BY AUTOMATED COUNT: 14.3 % (ref 11–15)
GLUCOSE UR-MCNC: NORMAL MG/DL
HBV SURFACE AG SER-ACNC: <0.1 [IU]/L
HBV SURFACE AG SERPL QL IA: NONREACTIVE
HCT VFR BLD AUTO: 34.6 %
HCV AB SERPL QL IA: NONREACTIVE
HGB BLD-MCNC: 12.1 G/DL
HGB UR QL STRIP.AUTO: NEGATIVE
IMM GRANULOCYTES # BLD AUTO: 0.03 X10(3) UL (ref 0–1)
IMM GRANULOCYTES NFR BLD: 0.3 %
KETONES UR-MCNC: NEGATIVE MG/DL
LEUKOCYTE ESTERASE UR QL STRIP.AUTO: NEGATIVE
LYMPHOCYTES # BLD AUTO: 2 X10(3) UL (ref 1–4)
LYMPHOCYTES NFR BLD AUTO: 21.9 %
MCH RBC QN AUTO: 30.3 PG (ref 26–34)
MCHC RBC AUTO-ENTMCNC: 35 G/DL (ref 31–37)
MCV RBC AUTO: 86.5 FL
MONOCYTES # BLD AUTO: 0.68 X10(3) UL (ref 0.1–1)
MONOCYTES NFR BLD AUTO: 7.5 %
NEUTROPHILS # BLD AUTO: 6.24 X10 (3) UL (ref 1.5–7.7)
NEUTROPHILS # BLD AUTO: 6.24 X10(3) UL (ref 1.5–7.7)
NEUTROPHILS NFR BLD AUTO: 68.5 %
NITRITE UR QL STRIP.AUTO: NEGATIVE
PH UR: 7.5 [PH] (ref 5–8)
PLATELET # BLD AUTO: 229 10(3)UL (ref 150–450)
PROT UR-MCNC: NEGATIVE MG/DL
RBC # BLD AUTO: 4 X10(6)UL
RH BLOOD TYPE: POSITIVE
RUBV IGG SER QL: POSITIVE
RUBV IGG SER-ACNC: 218.2 IU/ML (ref 10–?)
SP GR UR STRIP: 1.01 (ref 1–1.03)
UROBILINOGEN UR STRIP-ACNC: NORMAL
WBC # BLD AUTO: 9.1 X10(3) UL (ref 4–11)

## 2023-10-30 PROCEDURE — 86762 RUBELLA ANTIBODY: CPT

## 2023-10-30 PROCEDURE — 86850 RBC ANTIBODY SCREEN: CPT

## 2023-10-30 PROCEDURE — 87086 URINE CULTURE/COLONY COUNT: CPT

## 2023-10-30 PROCEDURE — 87077 CULTURE AEROBIC IDENTIFY: CPT

## 2023-10-30 PROCEDURE — 82728 ASSAY OF FERRITIN: CPT

## 2023-10-30 PROCEDURE — 87340 HEPATITIS B SURFACE AG IA: CPT

## 2023-10-30 PROCEDURE — 3078F DIAST BP <80 MM HG: CPT | Performed by: OBSTETRICS & GYNECOLOGY

## 2023-10-30 PROCEDURE — 86803 HEPATITIS C AB TEST: CPT

## 2023-10-30 PROCEDURE — 81003 URINALYSIS AUTO W/O SCOPE: CPT

## 2023-10-30 PROCEDURE — 3074F SYST BP LT 130 MM HG: CPT | Performed by: OBSTETRICS & GYNECOLOGY

## 2023-10-30 PROCEDURE — 86900 BLOOD TYPING SEROLOGIC ABO: CPT

## 2023-10-30 PROCEDURE — 85025 COMPLETE CBC W/AUTO DIFF WBC: CPT

## 2023-10-30 PROCEDURE — 86780 TREPONEMA PALLIDUM: CPT

## 2023-10-30 PROCEDURE — 86901 BLOOD TYPING SEROLOGIC RH(D): CPT

## 2023-10-30 PROCEDURE — 36415 COLL VENOUS BLD VENIPUNCTURE: CPT

## 2023-10-30 PROCEDURE — 87389 HIV-1 AG W/HIV-1&-2 AB AG IA: CPT

## 2023-11-01 ENCOUNTER — TELEPHONE (OUTPATIENT)
Dept: OBGYN CLINIC | Facility: CLINIC | Age: 28
End: 2023-11-01

## 2023-11-01 DIAGNOSIS — Z34.00 SUPERVISION OF NORMAL FIRST PREGNANCY, ANTEPARTUM: Primary | ICD-10-CM

## 2023-11-01 PROBLEM — R82.71 GROUP B STREPTOCOCCAL BACTERIURIA: Status: ACTIVE | Noted: 2023-11-01

## 2023-11-01 PROBLEM — R79.0 LOW SERUM FERRITIN LEVEL: Status: ACTIVE | Noted: 2023-11-01

## 2023-11-01 LAB — T PALLIDUM AB SER QL: NEGATIVE

## 2023-11-01 NOTE — TELEPHONE ENCOUNTER
----- Message from Chilo Butler MD sent at 11/1/2023  6:33 AM CDT -----  Please notify patient of NL prenatal labs except low ferritin and GBBS in urine. I have sent iron and antibiotics to her pharmacy. Ursula Dykes

## 2023-11-02 NOTE — TELEPHONE ENCOUNTER
Patient verified name and     Patient aware of MD recommendations and agreed. Patient requesting 1 hr glucose test. States when she is eating, her foods are tasting abnormally sweet even if they are not. Aware message will be sent to JJF . Verbalized understanding and agreed.

## 2023-11-05 NOTE — TELEPHONE ENCOUNTER
Please notify patient that a 50 gram glucola has been sent to the lab. She does not have any specific risk factors for an early glucola.

## 2023-11-07 NOTE — TELEPHONE ENCOUNTER
Patient verified name and     Aware of recommendations and agreed. Patient also mentioned yesterday when she lifted her gallon of milk she felt some leaking of urine. Denies current symptoms or bleeding. Has been having more sharp pain when lifting heavier items. Discussed avoiding strenuous lifting, if she develops new or worsening symptoms such as LOF or bleeding patient to let us know. Verbalized understanding and agreed.

## 2023-11-22 ENCOUNTER — LAB ENCOUNTER (OUTPATIENT)
Dept: LAB | Age: 28
End: 2023-11-22
Attending: OBSTETRICS & GYNECOLOGY
Payer: COMMERCIAL

## 2023-11-22 DIAGNOSIS — Z34.00 SUPERVISION OF NORMAL FIRST PREGNANCY, ANTEPARTUM: ICD-10-CM

## 2023-11-22 LAB — GLUCOSE 1H P GLC SERPL-MCNC: 123 MG/DL

## 2023-11-22 PROCEDURE — 36415 COLL VENOUS BLD VENIPUNCTURE: CPT

## 2023-11-22 PROCEDURE — 82950 GLUCOSE TEST: CPT

## 2023-11-27 ENCOUNTER — HOSPITAL ENCOUNTER (OUTPATIENT)
Dept: PERINATAL CARE | Facility: HOSPITAL | Age: 28
Discharge: HOME | End: 2023-11-27
Attending: OBSTETRICS & GYNECOLOGY
Payer: COMMERCIAL

## 2023-11-27 ENCOUNTER — HOSPITAL ENCOUNTER (OUTPATIENT)
Dept: PERINATAL CARE | Facility: HOSPITAL | Age: 28
End: 2023-11-27
Attending: OBSTETRICS & GYNECOLOGY
Payer: COMMERCIAL

## 2023-11-27 VITALS
HEART RATE: 76 BPM | WEIGHT: 155 LBS | DIASTOLIC BLOOD PRESSURE: 74 MMHG | BODY MASS INDEX: 27 KG/M2 | SYSTOLIC BLOOD PRESSURE: 124 MMHG

## 2023-11-27 DIAGNOSIS — Z36.3 ENCOUNTER FOR ANTENATAL SCREENING FOR MALFORMATION USING ULTRASOUND: ICD-10-CM

## 2023-11-27 DIAGNOSIS — Z36.3 ENCOUNTER FOR ANTENATAL SCREENING FOR MALFORMATION USING ULTRASOUND: Primary | ICD-10-CM

## 2023-11-27 PROCEDURE — 76805 OB US >/= 14 WKS SNGL FETUS: CPT | Performed by: OBSTETRICS & GYNECOLOGY

## 2023-11-27 NOTE — PROGRESS NOTES
STANDARD OBSTETRIC ULTRASOUND REPORT   See imaging tab for complete consultation / ultrasound report      Ultrasound findings:   Single IUP in cephalic presentation. Placenta is posterior. A 3 vessel cord is noted. Cardiac activity is present at 138 bpm   g ( 0 lb 14 oz);  MVP is 3.8 cm  Patient c/o pain and sensitivity during exam. Recommended she notify her OB. Fetal Anatomy:  Visualized with normal appearance: head, face, spine, neck, skin, chest, abdominal wall, gastrointestinal tract, kidneys, bladder, extremities. Brain: Visualized and normal appearances: brain parenchyma, cerebral ventricles, choroid plexus, Cisterna Magna, midline falx, cerebellum, cerebellar lobes, posterior fossa, vermis, cavum septi pellucidi. Face: eyes normal, profile normal, nose normal, lip normal, palate normal.  Heart: visualized and normal appearance: 3 vessel view, four-chamber, left outflow tract, right outflow tract, arches. Genetic Sonogram:  Nuchal fold normal.    Pylelectasis absent. No hyperechogenic bowel. Echogenic intracardiac foci absent. Nasal bone present. Choroid plexus cyst absent. Summary of Ultrasound findings: This is a [place] pregnancy       The fetal measurements are consistent with established EDC. No gross ultrasound evidence of structural abnormalities are seen today. No minor markers for aneuploidy are seen. The patient understands that ultrasound cannot rule out all structural and chromosomal abnormalities. IMPRESSION  IUP @ 21w2d  Scan consistent with dates  No fetal structural abnormalities seen    RECOMMENDATIONS:  Routine antepartum care  Had pain and sensitivity during exam-recommended discuss this with her OB. Dallas Lacy MD      This was an ultrasound only encounter (no physician visit). The ultrasound was read by Dr. Jimena Morgan and the report was sent to Dr. Dario Reddy to discuss with the patient.     Note to patient and family  The [employer] Act makes medical notes available to patients in the interest of transparency. However, please be advised that this is a medical document. It is intended as [id number] communication. It is written and medical language may contain abbreviations or verbiage that are technical and unfamiliar. It may appear blunt or direct. Medical documents are intended to carry relevant information, facts as evident, and the clinical opinion of the practitioner.

## 2023-12-05 ENCOUNTER — ROUTINE PRENATAL (OUTPATIENT)
Dept: OBGYN CLINIC | Facility: CLINIC | Age: 28
End: 2023-12-05

## 2023-12-05 VITALS — WEIGHT: 157 LBS | BODY MASS INDEX: 27 KG/M2 | DIASTOLIC BLOOD PRESSURE: 66 MMHG | SYSTOLIC BLOOD PRESSURE: 122 MMHG

## 2023-12-05 DIAGNOSIS — Z34.82 ENCOUNTER FOR SUPERVISION OF OTHER NORMAL PREGNANCY IN SECOND TRIMESTER: Primary | ICD-10-CM

## 2023-12-05 PROCEDURE — 3074F SYST BP LT 130 MM HG: CPT | Performed by: OBSTETRICS & GYNECOLOGY

## 2023-12-05 PROCEDURE — 3078F DIAST BP <80 MM HG: CPT | Performed by: OBSTETRICS & GYNECOLOGY

## 2023-12-11 ENCOUNTER — TELEPHONE (OUTPATIENT)
Dept: OBGYN CLINIC | Facility: CLINIC | Age: 28
End: 2023-12-11

## 2023-12-11 NOTE — TELEPHONE ENCOUNTER
called in states wife need a letter stating its ok to fly out the country    also states when pateint walks it hurts, and it feel like the baby drops down. .. Request a nurse to call for guidance,  needed.

## 2023-12-11 NOTE — TELEPHONE ENCOUNTER
Patient verified name and     Patient states she has been feeling pelvic pressure when walking/sitting for a long periods of time. Patient states she is planning to travel this Thursday. Recommended for her to be seen prior. Agreed. Scheduled tomorrow with AMM.  Aware of scheduling details,

## 2023-12-12 ENCOUNTER — ROUTINE PRENATAL (OUTPATIENT)
Dept: OBGYN CLINIC | Facility: CLINIC | Age: 28
End: 2023-12-12

## 2023-12-12 VITALS
WEIGHT: 160 LBS | HEART RATE: 76 BPM | BODY MASS INDEX: 27 KG/M2 | SYSTOLIC BLOOD PRESSURE: 119 MMHG | DIASTOLIC BLOOD PRESSURE: 78 MMHG

## 2023-12-12 DIAGNOSIS — Z34.82 ENCOUNTER FOR SUPERVISION OF OTHER NORMAL PREGNANCY IN SECOND TRIMESTER: Primary | ICD-10-CM

## 2023-12-12 LAB
APPEARANCE: CLEAR
BILIRUBIN: NEGATIVE
GLUCOSE (URINE DIPSTICK): NEGATIVE MG/DL
KETONES (URINE DIPSTICK): NEGATIVE MG/DL
MULTISTIX LOT#: ABNORMAL NUMERIC
NITRITE, URINE: NEGATIVE
OCCULT BLOOD: NEGATIVE
PH, URINE: 5 (ref 4.5–8)
PROTEIN (URINE DIPSTICK): NEGATIVE MG/DL
SPECIFIC GRAVITY: 1 (ref 1–1.03)
URINE-COLOR: YELLOW
UROBILINOGEN,SEMI-QN: 1 MG/DL (ref 0–1.9)

## 2023-12-12 PROCEDURE — 3074F SYST BP LT 130 MM HG: CPT | Performed by: STUDENT IN AN ORGANIZED HEALTH CARE EDUCATION/TRAINING PROGRAM

## 2023-12-12 PROCEDURE — 81002 URINALYSIS NONAUTO W/O SCOPE: CPT | Performed by: STUDENT IN AN ORGANIZED HEALTH CARE EDUCATION/TRAINING PROGRAM

## 2023-12-12 PROCEDURE — 3078F DIAST BP <80 MM HG: CPT | Performed by: STUDENT IN AN ORGANIZED HEALTH CARE EDUCATION/TRAINING PROGRAM

## 2023-12-12 NOTE — PROGRESS NOTES
Pt is planning on flying to [place] and wants to make sure she is okay to travel. Pt states after long periods of walking/ sitting she starts to notice cramping and pelvic pressure, states discomfort goes away when she lays down. Reports good fetal movement. No bleeding, fluid leakage, or contractions. Advised to patient that she may travel up until 35 weeks. Pt reassured pelvic pressure and cramping is common after long periods or walking, standing, or sitting.

## 2024-01-09 ENCOUNTER — LAB ENCOUNTER (OUTPATIENT)
Dept: LAB | Age: 29
End: 2024-01-09
Attending: STUDENT IN AN ORGANIZED HEALTH CARE EDUCATION/TRAINING PROGRAM
Payer: COMMERCIAL

## 2024-01-09 ENCOUNTER — ROUTINE PRENATAL (OUTPATIENT)
Dept: OBGYN CLINIC | Facility: CLINIC | Age: 29
End: 2024-01-09

## 2024-01-09 VITALS — BODY MASS INDEX: 28 KG/M2 | WEIGHT: 162.81 LBS | SYSTOLIC BLOOD PRESSURE: 128 MMHG | DIASTOLIC BLOOD PRESSURE: 73 MMHG

## 2024-01-09 DIAGNOSIS — Z34.83 ENCOUNTER FOR SUPERVISION OF OTHER NORMAL PREGNANCY IN THIRD TRIMESTER: Primary | ICD-10-CM

## 2024-01-09 LAB
APPEARANCE: CLEAR
BILIRUBIN: NEGATIVE
GLUCOSE (URINE DIPSTICK): NEGATIVE MG/DL
KETONES (URINE DIPSTICK): NEGATIVE MG/DL
LEUKOCYTES: NEGATIVE
MULTISTIX LOT#: NORMAL NUMERIC
NITRITE, URINE: NEGATIVE
OCCULT BLOOD: NEGATIVE
PH, URINE: 7.5 (ref 4.5–8)
PROTEIN (URINE DIPSTICK): NEGATIVE MG/DL
SPECIFIC GRAVITY: 1.01 (ref 1–1.03)
URINE-COLOR: YELLOW
UROBILINOGEN,SEMI-QN: 0.2 MG/DL (ref 0–1.9)

## 2024-01-09 PROCEDURE — 90715 TDAP VACCINE 7 YRS/> IM: CPT | Performed by: STUDENT IN AN ORGANIZED HEALTH CARE EDUCATION/TRAINING PROGRAM

## 2024-01-09 PROCEDURE — 3074F SYST BP LT 130 MM HG: CPT | Performed by: STUDENT IN AN ORGANIZED HEALTH CARE EDUCATION/TRAINING PROGRAM

## 2024-01-09 PROCEDURE — 3078F DIAST BP <80 MM HG: CPT | Performed by: STUDENT IN AN ORGANIZED HEALTH CARE EDUCATION/TRAINING PROGRAM

## 2024-01-09 PROCEDURE — 81002 URINALYSIS NONAUTO W/O SCOPE: CPT | Performed by: STUDENT IN AN ORGANIZED HEALTH CARE EDUCATION/TRAINING PROGRAM

## 2024-01-09 PROCEDURE — 90471 IMMUNIZATION ADMIN: CPT | Performed by: STUDENT IN AN ORGANIZED HEALTH CARE EDUCATION/TRAINING PROGRAM

## 2024-01-09 NOTE — PROGRESS NOTES
[facility]  Obstetrics and Gynecology  Prenatal Visit  Demarcus Madden PA-C    HPI   Shandra Rashid is a 28 year old.o.  27w3d weeks.    Pt is here for routine prenatal visit. No complaints or concerns.   Denies any regular uterine contractions, spontaneous rupture membranes or vaginal bleeding.  Patient feeling normal fetal movement.    OB History     OB History    Para Term  AB Living   1 0 0 0 0 0   SAB IAB Ectopic Multiple Live Births   0 0 0 0 0      # Outcome Date GA Lbr Fabian/2nd Weight Sex Delivery Anes PTL Lv   1 Current              Medications     Current Outpatient Medications   Medication Sig Dispense Refill    Ferrous Sulfate 325 (65 Fe) MG Oral Tab Take 1 tablet (325 mg total) by mouth every other day. 45 tablet 1    Prenatal Vit-Fe Fumarate-FA (M-JAZIEL PLUS) 27-1 MG Oral Tab Take 1 tablet by mouth daily. 90 tablet 3    cetirizine 1 MG/ML Oral Solution Take 5 mL (5 mg total) by mouth daily. (Patient not taking: Reported on 10/30/2023)      medroxyPROGESTERone Acetate (PROVERA) 10 MG Oral Tab Take 1 tablet by mouth for 10 days (Patient not taking: Reported on 10/30/2023) 10 tablet 0    DULoxetine 60 MG Oral Cap DR Particles Take 1 capsule (60 mg total) by mouth daily. (Patient not taking: Reported on 10/30/2023) 30 capsule 1    omeprazole 20 MG Oral Capsule Delayed Release Take 1 capsule (20 mg total) by mouth every morning before breakfast. (Patient not taking: Reported on 10/30/2023)      Inulin (FIBER CHOICE OR) Take by mouth. (Patient not taking: Reported on 10/30/2023)       Exam   LMP 05/10/2023 (Exact Date)   FH: 27  FHTs: 140  Assessment   Shandra is a 28 year old female  with viable IUP at 27w3d weeks.      ICD-10-CM    1. Encounter for supervision of other normal pregnancy in third trimester  Z34.83 POC Urinalysis, Manual Dip without microscopy [[id number]]        Plan   - 3rd trimester counseling completed.  - Pt counseled on fetal kick counts.   Recommend daily that patient have something to eat or drink and concentrate on the baby for an hour.  If she does not experience 5-6 movements or if she has any subjective decrease in movement she should go to L&D for an NST or contact the office if she has questions.  - Labor precautions discussed  - Recommended tdap vaccine in pregnancy which patient can get starting at 27 weeks until 36 weeks.  Discussed risks of pertussis/\"whooping cough\", in newborns.  Discussed benefits of preventing pertussis in those around the  such as parents, grandparents, caregivers, household contacts, other children etc.  Discussed side effects and rare risks of tdap vaccine including redness, pain, fatigue, body aches and fever.  Pt understands and will receive.  - RTC in 2 weeks      NICOLASA WILLSON PA-C  12:01 PM  2024

## 2024-01-19 ENCOUNTER — LAB ENCOUNTER (OUTPATIENT)
Dept: LAB | Age: 29
End: 2024-01-19
Attending: STUDENT IN AN ORGANIZED HEALTH CARE EDUCATION/TRAINING PROGRAM
Payer: COMMERCIAL

## 2024-01-19 DIAGNOSIS — Z34.83 ENCOUNTER FOR SUPERVISION OF OTHER NORMAL PREGNANCY IN THIRD TRIMESTER: ICD-10-CM

## 2024-01-19 LAB
BASOPHILS # BLD AUTO: 0.03 X10(3) UL (ref 0–0.2)
BASOPHILS NFR BLD AUTO: 0.4 %
DEPRECATED HBV CORE AB SER IA-ACNC: 12.6 NG/ML
DEPRECATED RDW RBC AUTO: 42.2 FL (ref 35.1–46.3)
EOSINOPHIL # BLD AUTO: 0.08 X10(3) UL (ref 0–0.7)
EOSINOPHIL NFR BLD AUTO: 1 %
ERYTHROCYTE [DISTWIDTH] IN BLOOD BY AUTOMATED COUNT: 12.9 % (ref 11–15)
GLUCOSE 1H P GLC SERPL-MCNC: 134 MG/DL
HCT VFR BLD AUTO: 35.2 %
HGB BLD-MCNC: 12.6 G/DL
IMM GRANULOCYTES # BLD AUTO: 0.04 X10(3) UL (ref 0–1)
IMM GRANULOCYTES NFR BLD: 0.5 %
LYMPHOCYTES # BLD AUTO: 1.83 X10(3) UL (ref 1–4)
LYMPHOCYTES NFR BLD AUTO: 23.1 %
MCH RBC QN AUTO: 32.1 PG (ref 26–34)
MCHC RBC AUTO-ENTMCNC: 35.8 G/DL (ref 31–37)
MCV RBC AUTO: 89.8 FL
MONOCYTES # BLD AUTO: 0.65 X10(3) UL (ref 0.1–1)
MONOCYTES NFR BLD AUTO: 8.2 %
NEUTROPHILS # BLD AUTO: 5.28 X10 (3) UL (ref 1.5–7.7)
NEUTROPHILS # BLD AUTO: 5.28 X10(3) UL (ref 1.5–7.7)
NEUTROPHILS NFR BLD AUTO: 66.8 %
PLATELET # BLD AUTO: 216 10(3)UL (ref 150–450)
RBC # BLD AUTO: 3.92 X10(6)UL
WBC # BLD AUTO: 7.9 X10(3) UL (ref 4–11)

## 2024-01-19 PROCEDURE — 82728 ASSAY OF FERRITIN: CPT

## 2024-01-19 PROCEDURE — 82950 GLUCOSE TEST: CPT

## 2024-01-19 PROCEDURE — 36415 COLL VENOUS BLD VENIPUNCTURE: CPT

## 2024-01-19 PROCEDURE — 85025 COMPLETE CBC W/AUTO DIFF WBC: CPT

## 2024-01-22 ENCOUNTER — TELEPHONE (OUTPATIENT)
Dept: OBGYN CLINIC | Facility: CLINIC | Age: 29
End: 2024-01-22

## 2024-01-22 DIAGNOSIS — Z34.83 ENCOUNTER FOR SUPERVISION OF OTHER NORMAL PREGNANCY IN THIRD TRIMESTER: Primary | ICD-10-CM

## 2024-01-24 ENCOUNTER — TELEPHONE (OUTPATIENT)
Dept: OBGYN CLINIC | Facility: CLINIC | Age: 29
End: 2024-01-24

## 2024-01-24 NOTE — TELEPHONE ENCOUNTER
Spouse calling  Pt mother needs a note for immugration/visa to give permission for Pt mother to come from [facility] to help pt after the baby is born.  Pt has fibromyalgia and probably will worse with those symptoms and will need help.    Pls advise when ready pickup in [place]

## 2024-01-24 NOTE — TELEPHONE ENCOUNTER
Spouse on FYI    Spouse aware that I spoke with patient yesterday and discussed she will need to speak with provider at visit if it is decided letter is appropriate it can be given to patient. We cannot provider letter unless it is discussed by provider that due to history she is in need of additional support person. Verbalized understanding and agreed.

## 2024-02-02 ENCOUNTER — ROUTINE PRENATAL (OUTPATIENT)
Dept: OBGYN CLINIC | Facility: CLINIC | Age: 29
End: 2024-02-02

## 2024-02-02 VITALS — DIASTOLIC BLOOD PRESSURE: 75 MMHG | BODY MASS INDEX: 28 KG/M2 | SYSTOLIC BLOOD PRESSURE: 114 MMHG | WEIGHT: 166 LBS

## 2024-02-02 DIAGNOSIS — Z34.83 ENCOUNTER FOR SUPERVISION OF OTHER NORMAL PREGNANCY IN THIRD TRIMESTER: Primary | ICD-10-CM

## 2024-02-02 LAB
APPEARANCE: CLEAR
BILIRUBIN: NEGATIVE
GLUCOSE (URINE DIPSTICK): NEGATIVE MG/DL
KETONES (URINE DIPSTICK): NEGATIVE MG/DL
LEUKOCYTES: NEGATIVE
MULTISTIX LOT#: NORMAL NUMERIC
NITRITE, URINE: NEGATIVE
OCCULT BLOOD: NEGATIVE
PH, URINE: 7 (ref 4.5–8)
PROTEIN (URINE DIPSTICK): NEGATIVE MG/DL
SPECIFIC GRAVITY: 1.01 (ref 1–1.03)
URINE-COLOR: YELLOW
UROBILINOGEN,SEMI-QN: 0.2 MG/DL (ref 0–1.9)

## 2024-02-02 PROCEDURE — 3078F DIAST BP <80 MM HG: CPT | Performed by: STUDENT IN AN ORGANIZED HEALTH CARE EDUCATION/TRAINING PROGRAM

## 2024-02-02 PROCEDURE — 81003 URINALYSIS AUTO W/O SCOPE: CPT | Performed by: STUDENT IN AN ORGANIZED HEALTH CARE EDUCATION/TRAINING PROGRAM

## 2024-02-02 PROCEDURE — 3074F SYST BP LT 130 MM HG: CPT | Performed by: STUDENT IN AN ORGANIZED HEALTH CARE EDUCATION/TRAINING PROGRAM

## 2024-02-02 NOTE — PROGRESS NOTES
[facility] [address]  Obstetrics and Gynecology  Prenatal Visit  Demarcus Madden PA-C    HPI   Shandra Rashid is a 28 year old.o.  30w6d weeks.    Pt is here for routine prenatal visit. No complaints or concerns.   Denies any regular uterine contractions, spontaneous rupture membranes or vaginal bleeding.  Patient feeling normal fetal movement.    OB History     OB History    Para Term  AB Living   1 0 0 0 0 0   SAB IAB Ectopic Multiple Live Births   0 0 0 0 0      # Outcome Date GA Lbr Fabian/2nd Weight Sex Delivery Anes PTL Lv   1 Current              Medications     Current Outpatient Medications   Medication Sig Dispense Refill    Ferrous Sulfate 325 (65 Fe) MG Oral Tab Take 1 tablet (325 mg total) by mouth every other day. 45 tablet 1    Prenatal Vit-Fe Fumarate-FA (M-JAZIEL PLUS) 27-1 MG Oral Tab Take 1 tablet by mouth daily. 90 tablet 3    Inulin (FIBER CHOICE OR) Take by mouth.      cetirizine 1 MG/ML Oral Solution Take 5 mL (5 mg total) by mouth daily. (Patient not taking: Reported on 10/30/2023)      medroxyPROGESTERone Acetate (PROVERA) 10 MG Oral Tab Take 1 tablet by mouth for 10 days (Patient not taking: Reported on 10/30/2023) 10 tablet 0    DULoxetine 60 MG Oral Cap DR Particles Take 1 capsule (60 mg total) by mouth daily. (Patient not taking: Reported on 10/30/2023) 30 capsule 1    omeprazole 20 MG Oral Capsule Delayed Release Take 1 capsule (20 mg total) by mouth every morning before breakfast. (Patient not taking: Reported on 10/30/2023)       Exam   /75   Wt 166 lb (75.3 kg)   LMP 05/10/2023 (Exact Date)   BMI 28.49 kg/m²   FH: 30  FHTs: 134  Assessment   Shandra is a 28 year old female  with viable IUP at 30w6d weeks.      ICD-10-CM    1. Encounter for supervision of other normal pregnancy in third trimester  Z34.83 POC Urinalysis, Automated Dip without microscopy (PCA and EMMG ONLY) [[id number]]        Plan   - Reminded of 3 hr GTT   - RTC 2  ivory WILLSON PA-C  8:33 AM  2/2/2024

## 2024-02-06 ENCOUNTER — LABORATORY ENCOUNTER (OUTPATIENT)
Dept: LAB | Age: 29
End: 2024-02-06
Attending: STUDENT IN AN ORGANIZED HEALTH CARE EDUCATION/TRAINING PROGRAM
Payer: COMMERCIAL

## 2024-02-06 DIAGNOSIS — Z34.83 ENCOUNTER FOR SUPERVISION OF OTHER NORMAL PREGNANCY IN THIRD TRIMESTER: ICD-10-CM

## 2024-02-06 LAB
EST. AVERAGE GLUCOSE BLD GHB EST-MCNC: 94 MG/DL (ref 68–126)
GLUCOSE 1H P GLC SERPL-MCNC: 153 MG/DL
GLUCOSE 2H P GLC SERPL-MCNC: 117 MG/DL
GLUCOSE 3H P GLC SERPL-MCNC: 87 MG/DL (ref 70–140)
GLUCOSE P FAST SERPL-MCNC: 80 MG/DL
HBA1C MFR BLD: 4.9 % (ref ?–5.7)

## 2024-02-06 PROCEDURE — 82952 GTT-ADDED SAMPLES: CPT

## 2024-02-06 PROCEDURE — 83036 HEMOGLOBIN GLYCOSYLATED A1C: CPT

## 2024-02-06 PROCEDURE — 82951 GLUCOSE TOLERANCE TEST (GTT): CPT

## 2024-02-06 PROCEDURE — 36415 COLL VENOUS BLD VENIPUNCTURE: CPT

## 2024-02-12 ENCOUNTER — TELEPHONE (OUTPATIENT)
Dept: OBGYN CLINIC | Facility: CLINIC | Age: 29
End: 2024-02-12

## 2024-02-12 NOTE — TELEPHONE ENCOUNTER
Patient verified name and     Patient calling in regards to 3 hr gtt. Aware of results and recommendations. No further questions.

## 2024-02-16 ENCOUNTER — ROUTINE PRENATAL (OUTPATIENT)
Dept: OBGYN CLINIC | Facility: CLINIC | Age: 29
End: 2024-02-16

## 2024-02-16 VITALS
BODY MASS INDEX: 28 KG/M2 | SYSTOLIC BLOOD PRESSURE: 103 MMHG | DIASTOLIC BLOOD PRESSURE: 71 MMHG | WEIGHT: 166 LBS | HEART RATE: 83 BPM

## 2024-02-16 DIAGNOSIS — Z34.83 ENCOUNTER FOR SUPERVISION OF OTHER NORMAL PREGNANCY IN THIRD TRIMESTER: Primary | ICD-10-CM

## 2024-02-16 LAB
BILIRUBIN: NEGATIVE
GLUCOSE (URINE DIPSTICK): NEGATIVE MG/DL
KETONES (URINE DIPSTICK): NEGATIVE MG/DL
MULTISTIX LOT#: ABNORMAL NUMERIC
NITRITE, URINE: NEGATIVE
OCCULT BLOOD: NEGATIVE
PH, URINE: 7 (ref 4.5–8)
PROTEIN (URINE DIPSTICK): NEGATIVE MG/DL
SPECIFIC GRAVITY: 1.01 (ref 1–1.03)
URINE-COLOR: YELLOW
UROBILINOGEN,SEMI-QN: 0.2 MG/DL (ref 0–1.9)

## 2024-02-16 PROCEDURE — 81003 URINALYSIS AUTO W/O SCOPE: CPT | Performed by: STUDENT IN AN ORGANIZED HEALTH CARE EDUCATION/TRAINING PROGRAM

## 2024-02-16 PROCEDURE — 3078F DIAST BP <80 MM HG: CPT | Performed by: STUDENT IN AN ORGANIZED HEALTH CARE EDUCATION/TRAINING PROGRAM

## 2024-02-16 PROCEDURE — 3074F SYST BP LT 130 MM HG: CPT | Performed by: STUDENT IN AN ORGANIZED HEALTH CARE EDUCATION/TRAINING PROGRAM

## 2024-02-16 NOTE — PROGRESS NOTES
[facility]  Obstetrics and Gynecology  Prenatal Visit  Demarcus Willson PA-C    HPI   Shandra Rashid is a 28 year old.o.  32w6d weeks.    Pt is here for routine prenatal visit. No complaints or concerns.   Denies any regular uterine contractions, spontaneous rupture membranes or vaginal bleeding.  Patient feeling normal fetal movement.    OB History     OB History    Para Term  AB Living   1 0 0 0 0 0   SAB IAB Ectopic Multiple Live Births   0 0 0 0 0      # Outcome Date GA Lbr Fabian/2nd Weight Sex Delivery Anes PTL Lv   1 Current              Medications     Current Outpatient Medications   Medication Sig Dispense Refill    Ferrous Sulfate 325 (65 Fe) MG Oral Tab Take 1 tablet (325 mg total) by mouth every other day. 45 tablet 1    Prenatal Vit-Fe Fumarate-FA (M-JAZIEL PLUS) 27-1 MG Oral Tab Take 1 tablet by mouth daily. 90 tablet 3    Inulin (FIBER CHOICE OR) Take by mouth.      cetirizine 1 MG/ML Oral Solution Take 5 mL (5 mg total) by mouth daily. (Patient not taking: Reported on 10/30/2023)      medroxyPROGESTERone Acetate (PROVERA) 10 MG Oral Tab Take 1 tablet by mouth for 10 days (Patient not taking: Reported on 10/30/2023) 10 tablet 0    DULoxetine 60 MG Oral Cap DR Particles Take 1 capsule (60 mg total) by mouth daily. (Patient not taking: Reported on 10/30/2023) 30 capsule 1    omeprazole 20 MG Oral Capsule Delayed Release Take 1 capsule (20 mg total) by mouth every morning before breakfast. (Patient not taking: Reported on 10/30/2023)       Exam   LMP 05/10/2023 (Exact Date)   FH: 32  FHTs: 150  Assessment   Shandra is a 28 year old female  with viable IUP at 32w6d weeks.      ICD-10-CM    1. Encounter for supervision of other normal pregnancy in third trimester  Z34.83         -Plan   - CBC, ferritin, HIV, and RPR ordered  - RTC in 2 weeks    DEMARCUS WILLSON PA-C  3:27 PM  2024

## 2024-02-23 ENCOUNTER — LAB ENCOUNTER (OUTPATIENT)
Dept: LAB | Facility: HOSPITAL | Age: 29
End: 2024-02-23
Attending: STUDENT IN AN ORGANIZED HEALTH CARE EDUCATION/TRAINING PROGRAM
Payer: COMMERCIAL

## 2024-02-23 DIAGNOSIS — Z34.83 ENCOUNTER FOR SUPERVISION OF OTHER NORMAL PREGNANCY IN THIRD TRIMESTER (HCC): ICD-10-CM

## 2024-02-23 LAB
BASOPHILS # BLD AUTO: 0.03 X10(3) UL (ref 0–0.2)
BASOPHILS NFR BLD AUTO: 0.4 %
DEPRECATED HBV CORE AB SER IA-ACNC: 14.7 NG/ML
DEPRECATED RDW RBC AUTO: 42.3 FL (ref 35.1–46.3)
EOSINOPHIL # BLD AUTO: 0.07 X10(3) UL (ref 0–0.7)
EOSINOPHIL NFR BLD AUTO: 0.8 %
ERYTHROCYTE [DISTWIDTH] IN BLOOD BY AUTOMATED COUNT: 13 % (ref 11–15)
HCT VFR BLD AUTO: 37.8 %
HGB BLD-MCNC: 13.4 G/DL
IMM GRANULOCYTES # BLD AUTO: 0.04 X10(3) UL (ref 0–1)
IMM GRANULOCYTES NFR BLD: 0.5 %
LYMPHOCYTES # BLD AUTO: 2.09 X10(3) UL (ref 1–4)
LYMPHOCYTES NFR BLD AUTO: 25 %
MCH RBC QN AUTO: 31.9 PG (ref 26–34)
MCHC RBC AUTO-ENTMCNC: 35.4 G/DL (ref 31–37)
MCV RBC AUTO: 90 FL
MONOCYTES # BLD AUTO: 0.79 X10(3) UL (ref 0.1–1)
MONOCYTES NFR BLD AUTO: 9.5 %
NEUTROPHILS # BLD AUTO: 5.33 X10 (3) UL (ref 1.5–7.7)
NEUTROPHILS # BLD AUTO: 5.33 X10(3) UL (ref 1.5–7.7)
NEUTROPHILS NFR BLD AUTO: 63.8 %
PLATELET # BLD AUTO: 186 10(3)UL (ref 150–450)
RBC # BLD AUTO: 4.2 X10(6)UL
T PALLIDUM AB SER QL IA: NONREACTIVE
WBC # BLD AUTO: 8.4 X10(3) UL (ref 4–11)

## 2024-02-23 PROCEDURE — 85025 COMPLETE CBC W/AUTO DIFF WBC: CPT

## 2024-02-23 PROCEDURE — 86780 TREPONEMA PALLIDUM: CPT

## 2024-02-23 PROCEDURE — 36415 COLL VENOUS BLD VENIPUNCTURE: CPT

## 2024-02-23 PROCEDURE — 82728 ASSAY OF FERRITIN: CPT

## 2024-02-23 PROCEDURE — 87389 HIV-1 AG W/HIV-1&-2 AB AG IA: CPT

## 2024-03-01 ENCOUNTER — HOSPITAL ENCOUNTER (OUTPATIENT)
Facility: HOSPITAL | Age: 29
Discharge: HOME | End: 2024-03-01
Attending: OBSTETRICS & GYNECOLOGY | Admitting: OBSTETRICS & GYNECOLOGY
Payer: COMMERCIAL

## 2024-03-01 ENCOUNTER — ROUTINE PRENATAL (OUTPATIENT)
Dept: OBGYN CLINIC | Facility: CLINIC | Age: 29
End: 2024-03-01

## 2024-03-01 VITALS — SYSTOLIC BLOOD PRESSURE: 117 MMHG | HEART RATE: 75 BPM | DIASTOLIC BLOOD PRESSURE: 72 MMHG

## 2024-03-01 VITALS — BODY MASS INDEX: 28 KG/M2 | WEIGHT: 166 LBS | DIASTOLIC BLOOD PRESSURE: 84 MMHG | SYSTOLIC BLOOD PRESSURE: 124 MMHG

## 2024-03-01 DIAGNOSIS — Z34.83 ENCOUNTER FOR SUPERVISION OF OTHER NORMAL PREGNANCY IN THIRD TRIMESTER (HCC): Primary | ICD-10-CM

## 2024-03-01 LAB
APPEARANCE: CLEAR
BILIRUBIN: NEGATIVE
GLUCOSE (URINE DIPSTICK): NEGATIVE MG/DL
KETONES (URINE DIPSTICK): NEGATIVE MG/DL
MULTISTIX LOT#: ABNORMAL NUMERIC
NITRITE, URINE: NEGATIVE
OCCULT BLOOD: NEGATIVE
PH, URINE: 7 (ref 4.5–8)
PROTEIN (URINE DIPSTICK): NEGATIVE MG/DL
SPECIFIC GRAVITY: 1.01 (ref 1–1.03)
URINE-COLOR: YELLOW
UROBILINOGEN,SEMI-QN: 0.2 MG/DL (ref 0–1.9)

## 2024-03-01 PROCEDURE — 59025 FETAL NON-STRESS TEST: CPT

## 2024-03-01 PROCEDURE — 3074F SYST BP LT 130 MM HG: CPT | Performed by: OBSTETRICS & GYNECOLOGY

## 2024-03-01 PROCEDURE — 81002 URINALYSIS NONAUTO W/O SCOPE: CPT | Performed by: OBSTETRICS & GYNECOLOGY

## 2024-03-01 PROCEDURE — 99212 OFFICE O/P EST SF 10 MIN: CPT

## 2024-03-01 PROCEDURE — 3079F DIAST BP 80-89 MM HG: CPT | Performed by: OBSTETRICS & GYNECOLOGY

## 2024-03-01 NOTE — TRIAGE
[facility]  part of [facility]      Triage Note    Shandra Rashid Patient Status:  Outpatient    1995 MRN [id number]   Location [facility] Attending Juju Rosas MD   Hosp Day # 0 PCP Brayan Ardon MD      Para:   Estimated Date of Delivery: 24  Gestation: 34w6d    Chief Complaint    Decreased Fetal Movement         Allergies:  No Known Allergies    No orders of the defined types were placed in this encounter.      Lab Results   Component Value Date    WBC 8.4 2024    HGB 13.4 2024    HCT 37.8 2024    .0 2024    CREATSERUM 0.82 2023    BUN 7 2023     2023    K 3.5 2023     2023    CO2 23.0 2023    GLU 96 2023    CA 8.8 2023    ALB 3.9 2023    ALKPHO 72 2023    BILT 0.5 2023    TP 8.1 2023    AST 25 2023    ALT 37 2023    TSH 2.450 2021       Clinitek UA  Lab Results   Component Value Date    GLUUR Normal 10/30/2023    SPECGRAVITY 1.010 2024    URINECUL  10/30/2023     <10,000 cfu/ml Mixture of Gram positive organisms isolated - probable contamination.    URINECUL (A) 10/30/2023     <10,000 CFU/ML Streptococcus agalactiae (Group B beta strep)       UA  Lab Results   Component Value Date    COLORUR Light-Yellow 10/30/2023    CLARITY Clear 10/30/2023    SPECGRAVITY 1.010 2024    PROUR Negative 10/30/2023    GLUUR Normal 10/30/2023    KETUR Negative 10/30/2023    BILUR Negative 10/30/2023    BLOODURINE Negative 10/30/2023    NITRITE Negative 2024    UROBILINOGEN Normal 10/30/2023    LEUUR Negative 10/30/2023       Vitals:    24 1215   BP: 117/72   Pulse: 75       NST  Variability: Moderate           Accelerations: Yes           Decelerations: None            Baseline: 135 BPM           Uterine Irritability: No           Contractions: Not present                                         Acoustic Stimulator: No           Nonstress Test Interpretation: Reactive           Nonstress Test Second Interpretation: Reactive                        Additional Comments Comments: Pt given marker and educated to use when feeling kicks. Pt stated she feels multiple kicks while in triage and multiple markings noted. Dr Rosas reviewed the case and pt discharged ambulatory with verbal and written instructions including kick count and warning signs. Pt reassured.     Chief Complaint   Patient presents with    Decreased Fetal Movement      at 34 6/7 sent from the office c/o \" baby moving less\". Pt denies cxs, lof or vaginal bleeding.         Starla PATEL RN  3/1/2024 12:57 PM

## 2024-03-01 NOTE — PROGRESS NOTES
Pt is a 28 year old female admitted to TR5/[facility]-A.     Chief Complaint   Patient presents with    Decreased Fetal Movement      Pt is  34w6d intra-uterine pregnancy.  History obtained, consents signed. Oriented to room, staff, and plan of care.

## 2024-03-01 NOTE — DISCHARGE INSTRUCTIONS
Discharge Instructions    Diet: general  Activity: Normal activity         General Instructions    Call your OB doctor if: Fluid leaking from your vagina;Decrease in fetal movement;Vaginal or rectal pressure;Uterine contractions 10 minutes or closer for 1 to 2 hours;Vaginal bleeding;Uterine contractions increasing in intensity and frequency;Temperature greater than 100F

## 2024-03-08 ENCOUNTER — MOBILE ENCOUNTER (OUTPATIENT)
Dept: OBGYN CLINIC | Facility: CLINIC | Age: 29
End: 2024-03-08

## 2024-03-08 NOTE — PROGRESS NOTES
Telephone call:    Patient paged due to possible allergic reaction.  Patient noted after eating strawberries she was having an allergic reaction.  That is all that she could describe.  She was informed to present to her nearest emergency department.    Francisco Bosch MD  [facility] covering for [facility] building group

## 2024-03-15 ENCOUNTER — ROUTINE PRENATAL (OUTPATIENT)
Dept: OBGYN CLINIC | Facility: CLINIC | Age: 29
End: 2024-03-15

## 2024-03-15 VITALS
HEART RATE: 83 BPM | SYSTOLIC BLOOD PRESSURE: 110 MMHG | DIASTOLIC BLOOD PRESSURE: 74 MMHG | BODY MASS INDEX: 29 KG/M2 | WEIGHT: 167 LBS

## 2024-03-15 DIAGNOSIS — Z34.83 ENCOUNTER FOR SUPERVISION OF OTHER NORMAL PREGNANCY IN THIRD TRIMESTER (HCC): ICD-10-CM

## 2024-03-15 DIAGNOSIS — Z36.85 ANTENATAL SCREENING FOR STREPTOCOCCUS B (HCC): Primary | ICD-10-CM

## 2024-03-15 DIAGNOSIS — R82.998 LEUKOCYTES IN URINE: ICD-10-CM

## 2024-03-15 PROCEDURE — 81003 URINALYSIS AUTO W/O SCOPE: CPT | Performed by: OBSTETRICS & GYNECOLOGY

## 2024-03-15 PROCEDURE — 3074F SYST BP LT 130 MM HG: CPT | Performed by: OBSTETRICS & GYNECOLOGY

## 2024-03-15 PROCEDURE — 3078F DIAST BP <80 MM HG: CPT | Performed by: OBSTETRICS & GYNECOLOGY

## 2024-03-15 RX ORDER — .BETA.-CAROTENE, ASCORBIC ACID, CHOLECALCIFEROL, .ALPHA.-TOCOPHEROL ACETATE, DL-, THIAMINE, RIBOFLAVIN, NIACINAMIDE, PYRIDOXINE HYDROCHLORIDE, FOLIC ACID, CYANOCOBALAMIN, CALCIUM PANTOTHENATE, CALCIUM CARBONATE, FERROUS FUMARATE, ZINC OXIDE AND DOCUSATE SODIUM 1000; 100; 400; 30; 3; 3; 15; 20; 1; 12; 7; 200; 29; 20; 25 [IU]/1; MG/1; [IU]/1; MG/1; MG/1; MG/1; MG/1; MG/1; MG/1; UG/1; MG/1; MG/1; MG/1; MG/1; MG/1
TABLET ORAL
COMMUNITY
Start: 2024-01-16

## 2024-03-16 LAB — GROUP B STREP BY PCR FOR PCR OVT: POSITIVE

## 2024-03-22 ENCOUNTER — ROUTINE PRENATAL (OUTPATIENT)
Dept: OBGYN CLINIC | Facility: CLINIC | Age: 29
End: 2024-03-22

## 2024-03-22 VITALS
SYSTOLIC BLOOD PRESSURE: 100 MMHG | WEIGHT: 168 LBS | DIASTOLIC BLOOD PRESSURE: 68 MMHG | HEART RATE: 94 BPM | BODY MASS INDEX: 29 KG/M2

## 2024-03-22 DIAGNOSIS — Z34.83 ENCOUNTER FOR SUPERVISION OF OTHER NORMAL PREGNANCY IN THIRD TRIMESTER (HCC): Primary | ICD-10-CM

## 2024-03-22 PROCEDURE — 3074F SYST BP LT 130 MM HG: CPT | Performed by: STUDENT IN AN ORGANIZED HEALTH CARE EDUCATION/TRAINING PROGRAM

## 2024-03-22 PROCEDURE — 3078F DIAST BP <80 MM HG: CPT | Performed by: STUDENT IN AN ORGANIZED HEALTH CARE EDUCATION/TRAINING PROGRAM

## 2024-03-22 NOTE — PROGRESS NOTES
[facility]  Obstetrics and Gynecology  Prenatal Visit  Demarcus Madden PA-C    HPI   Shandra Rashid is a 28 year old.o.  37w6d weeks.    Pt is here for routine prenatal visit. No complaints or concerns.   Denies any regular uterine contractions, spontaneous rupture membranes or vaginal bleeding.  Patient feeling normal fetal movement.    OB History     OB History    Para Term  AB Living   1 0 0 0 0 0   SAB IAB Ectopic Multiple Live Births   0 0 0 0 0      # Outcome Date GA Lbr Fabian/2nd Weight Sex Delivery Anes PTL Lv   1 Current              Medications     Current Outpatient Medications   Medication Sig Dispense Refill    Ferrous Sulfate 325 (65 Fe) MG Oral Tab Take 1 tablet (325 mg total) by mouth every other day. 45 tablet 1    Prenatal Vit-DSS-Fe Fum-FA (SE-JAZIEL 19) 29-1 MG Oral Tab       Prenatal Vit-Fe Fumarate-FA (M- PLUS) 27-1 MG Oral Tab Take 1 tablet by mouth daily. (Patient not taking: Reported on 3/15/2024) 90 tablet 3    cetirizine 1 MG/ML Oral Solution Take 5 mL (5 mg total) by mouth daily. (Patient not taking: Reported on 3/15/2024)      medroxyPROGESTERone Acetate (PROVERA) 10 MG Oral Tab Take 1 tablet by mouth for 10 days (Patient not taking: Reported on 3/1/2024) 10 tablet 0    DULoxetine 60 MG Oral Cap DR Particles Take 1 capsule (60 mg total) by mouth daily. (Patient not taking: Reported on 3/1/2024) 30 capsule 1    omeprazole 20 MG Oral Capsule Delayed Release Take 1 capsule (20 mg total) by mouth every morning before breakfast. (Patient not taking: Reported on 3/15/2024)      Inulin (FIBER CHOICE OR) Take by mouth. (Patient not taking: Reported on 3/15/2024)       Exam   LMP 05/10/2023 (Exact Date)   FH: 37  FHTs: 155  Assessment   Shadnra is a 28 year old female  with viable IUP at 37w6d weeks.      ICD-10-CM    1. Encounter for supervision of other normal pregnancy in third trimester (HCC)  Z34.83         Plan   - RTD in 1  week    NICOLASA WILLSON PA-C  3:15 PM  3/22/2024

## 2024-03-29 ENCOUNTER — ROUTINE PRENATAL (OUTPATIENT)
Dept: OBGYN CLINIC | Facility: CLINIC | Age: 29
End: 2024-03-29

## 2024-03-29 VITALS
SYSTOLIC BLOOD PRESSURE: 121 MMHG | BODY MASS INDEX: 29 KG/M2 | WEIGHT: 169 LBS | DIASTOLIC BLOOD PRESSURE: 83 MMHG | HEART RATE: 70 BPM

## 2024-03-29 DIAGNOSIS — Z34.83 ENCOUNTER FOR SUPERVISION OF OTHER NORMAL PREGNANCY IN THIRD TRIMESTER (HCC): Primary | ICD-10-CM

## 2024-03-29 LAB
APPEARANCE: CLEAR
BILIRUBIN: NEGATIVE
GLUCOSE (URINE DIPSTICK): NEGATIVE MG/DL
KETONES (URINE DIPSTICK): NEGATIVE MG/DL
LEUKOCYTES: NEGATIVE
MULTISTIX LOT#: NORMAL NUMERIC
NITRITE, URINE: NEGATIVE
OCCULT BLOOD: NEGATIVE
PH, URINE: 6.5 (ref 4.5–8)
PROTEIN (URINE DIPSTICK): NEGATIVE MG/DL
SPECIFIC GRAVITY: 1.01 (ref 1–1.03)
URINE-COLOR: YELLOW
UROBILINOGEN,SEMI-QN: 0.2 MG/DL (ref 0–1.9)

## 2024-03-29 PROCEDURE — 81003 URINALYSIS AUTO W/O SCOPE: CPT | Performed by: OBSTETRICS & GYNECOLOGY

## 2024-03-29 PROCEDURE — 3074F SYST BP LT 130 MM HG: CPT | Performed by: OBSTETRICS & GYNECOLOGY

## 2024-03-29 PROCEDURE — 3079F DIAST BP 80-89 MM HG: CPT | Performed by: OBSTETRICS & GYNECOLOGY

## 2024-04-05 ENCOUNTER — ROUTINE PRENATAL (OUTPATIENT)
Dept: OBGYN CLINIC | Facility: CLINIC | Age: 29
End: 2024-04-05

## 2024-04-05 ENCOUNTER — HOSPITAL ENCOUNTER (OUTPATIENT)
Facility: HOSPITAL | Age: 29
Discharge: HOME | End: 2024-04-05
Attending: OBSTETRICS & GYNECOLOGY | Admitting: OBSTETRICS & GYNECOLOGY
Payer: COMMERCIAL

## 2024-04-05 ENCOUNTER — HOSPITAL ENCOUNTER (INPATIENT)
Facility: HOSPITAL | Age: 29
LOS: 3 days | Discharge: HOME | End: 2024-04-08
Attending: OBSTETRICS & GYNECOLOGY | Admitting: OBSTETRICS & GYNECOLOGY
Payer: COMMERCIAL

## 2024-04-05 VITALS — BODY MASS INDEX: 29 KG/M2 | SYSTOLIC BLOOD PRESSURE: 120 MMHG | WEIGHT: 171 LBS | DIASTOLIC BLOOD PRESSURE: 78 MMHG

## 2024-04-05 VITALS — SYSTOLIC BLOOD PRESSURE: 123 MMHG | HEART RATE: 72 BPM | DIASTOLIC BLOOD PRESSURE: 78 MMHG

## 2024-04-05 DIAGNOSIS — Z34.83 ENCOUNTER FOR SUPERVISION OF OTHER NORMAL PREGNANCY IN THIRD TRIMESTER (HCC): Primary | ICD-10-CM

## 2024-04-05 PROBLEM — Z34.90 PREGNANT (HCC): Status: ACTIVE | Noted: 2024-04-05

## 2024-04-05 PROCEDURE — 59025 FETAL NON-STRESS TEST: CPT

## 2024-04-05 PROCEDURE — 81003 URINALYSIS AUTO W/O SCOPE: CPT | Performed by: OBSTETRICS & GYNECOLOGY

## 2024-04-05 PROCEDURE — 3078F DIAST BP <80 MM HG: CPT | Performed by: OBSTETRICS & GYNECOLOGY

## 2024-04-05 PROCEDURE — 3074F SYST BP LT 130 MM HG: CPT | Performed by: OBSTETRICS & GYNECOLOGY

## 2024-04-05 PROCEDURE — 99213 OFFICE O/P EST LOW 20 MIN: CPT

## 2024-04-05 RX ORDER — ACETAMINOPHEN 500 MG
500 TABLET ORAL EVERY 6 HOURS PRN
Status: DISCONTINUED | OUTPATIENT
Start: 2024-04-05 | End: 2024-04-06

## 2024-04-05 RX ORDER — BUPIVACAINE HYDROCHLORIDE 2.5 MG/ML
20 INJECTION, SOLUTION EPIDURAL; INFILTRATION; INTRACAUDAL ONCE
Status: COMPLETED | OUTPATIENT
Start: 2024-04-05 | End: 2024-04-05

## 2024-04-05 RX ORDER — NALBUPHINE HYDROCHLORIDE 10 MG/ML
2.5 INJECTION, SOLUTION INTRAMUSCULAR; INTRAVENOUS; SUBCUTANEOUS
Status: DISCONTINUED | OUTPATIENT
Start: 2024-04-05 | End: 2024-04-06

## 2024-04-05 RX ORDER — ACETAMINOPHEN 500 MG
1000 TABLET ORAL EVERY 6 HOURS PRN
Status: DISCONTINUED | OUTPATIENT
Start: 2024-04-05 | End: 2024-04-06

## 2024-04-05 RX ORDER — BUPIVACAINE HCL/0.9 % NACL/PF 0.25 %
5 PLASTIC BAG, INJECTION (ML) EPIDURAL AS NEEDED
Status: DISCONTINUED | OUTPATIENT
Start: 2024-04-05 | End: 2024-04-06

## 2024-04-05 RX ORDER — CITRIC ACID/SODIUM CITRATE 334-500MG
30 SOLUTION, ORAL ORAL AS NEEDED
Status: DISCONTINUED | OUTPATIENT
Start: 2024-04-05 | End: 2024-04-06

## 2024-04-05 RX ORDER — SODIUM CHLORIDE, SODIUM LACTATE, POTASSIUM CHLORIDE, CALCIUM CHLORIDE 600; 310; 30; 20 MG/100ML; MG/100ML; MG/100ML; MG/100ML
INJECTION, SOLUTION INTRAVENOUS AS NEEDED
Status: DISCONTINUED | OUTPATIENT
Start: 2024-04-05 | End: 2024-04-06

## 2024-04-05 RX ORDER — DEXTROSE, SODIUM CHLORIDE, SODIUM LACTATE, POTASSIUM CHLORIDE, AND CALCIUM CHLORIDE 5; .6; .31; .03; .02 G/100ML; G/100ML; G/100ML; G/100ML; G/100ML
INJECTION, SOLUTION INTRAVENOUS CONTINUOUS
Status: DISCONTINUED | OUTPATIENT
Start: 2024-04-05 | End: 2024-04-06

## 2024-04-05 RX ORDER — TERBUTALINE SULFATE 1 MG/ML
0.25 INJECTION, SOLUTION SUBCUTANEOUS AS NEEDED
Status: DISCONTINUED | OUTPATIENT
Start: 2024-04-05 | End: 2024-04-06

## 2024-04-05 RX ORDER — IBUPROFEN 600 MG/1
600 TABLET ORAL ONCE AS NEEDED
Status: DISCONTINUED | OUTPATIENT
Start: 2024-04-05 | End: 2024-04-06

## 2024-04-05 RX ORDER — ONDANSETRON 2 MG/ML
4 INJECTION INTRAMUSCULAR; INTRAVENOUS EVERY 6 HOURS PRN
Status: DISCONTINUED | OUTPATIENT
Start: 2024-04-05 | End: 2024-04-06

## 2024-04-05 RX ORDER — LIDOCAINE HYDROCHLORIDE 10 MG/ML
30 INJECTION, SOLUTION EPIDURAL; INFILTRATION; INTRACAUDAL; PERINEURAL ONCE
Status: DISCONTINUED | OUTPATIENT
Start: 2024-04-05 | End: 2024-04-06

## 2024-04-06 PROBLEM — D50.8 IRON DEFICIENCY ANEMIA SECONDARY TO INADEQUATE DIETARY IRON INTAKE: Status: ACTIVE | Noted: 2024-04-06

## 2024-04-06 LAB
ANTIBODY SCREEN: NEGATIVE
BASOPHILS # BLD AUTO: 0.04 X10(3) UL (ref 0–0.2)
BASOPHILS NFR BLD AUTO: 0.3 %
DEPRECATED RDW RBC AUTO: 42.4 FL (ref 35.1–46.3)
EOSINOPHIL # BLD AUTO: 0.03 X10(3) UL (ref 0–0.7)
EOSINOPHIL NFR BLD AUTO: 0.2 %
ERYTHROCYTE [DISTWIDTH] IN BLOOD BY AUTOMATED COUNT: 13.1 % (ref 11–15)
HCT VFR BLD AUTO: 39.9 %
HGB BLD-MCNC: 14.1 G/DL
IMM GRANULOCYTES # BLD AUTO: 0.04 X10(3) UL (ref 0–1)
IMM GRANULOCYTES NFR BLD: 0.3 %
LYMPHOCYTES # BLD AUTO: 1.61 X10(3) UL (ref 1–4)
LYMPHOCYTES NFR BLD AUTO: 12.2 %
MCH RBC QN AUTO: 31.2 PG (ref 26–34)
MCHC RBC AUTO-ENTMCNC: 35.3 G/DL (ref 31–37)
MCV RBC AUTO: 88.3 FL
MONOCYTES # BLD AUTO: 0.66 X10(3) UL (ref 0.1–1)
MONOCYTES NFR BLD AUTO: 5 %
NEUTROPHILS # BLD AUTO: 10.79 X10 (3) UL (ref 1.5–7.7)
NEUTROPHILS # BLD AUTO: 10.79 X10(3) UL (ref 1.5–7.7)
NEUTROPHILS NFR BLD AUTO: 82 %
PLATELET # BLD AUTO: 170 10(3)UL (ref 150–450)
RBC # BLD AUTO: 4.52 X10(6)UL
RH BLOOD TYPE: POSITIVE
WBC # BLD AUTO: 13.2 X10(3) UL (ref 4–11)

## 2024-04-06 PROCEDURE — 59410 OBSTETRICAL CARE: CPT | Performed by: OBSTETRICS & GYNECOLOGY

## 2024-04-06 PROCEDURE — 0KQM0ZZ REPAIR PERINEUM MUSCLE, OPEN APPROACH: ICD-10-PCS | Performed by: OBSTETRICS & GYNECOLOGY

## 2024-04-06 RX ORDER — DOCUSATE SODIUM 100 MG/1
100 CAPSULE, LIQUID FILLED ORAL
Status: DISCONTINUED | OUTPATIENT
Start: 2024-04-06 | End: 2024-04-08

## 2024-04-06 RX ORDER — BUPIVACAINE HYDROCHLORIDE 2.5 MG/ML
20 INJECTION, SOLUTION EPIDURAL; INFILTRATION; INTRACAUDAL ONCE
Status: DISCONTINUED | OUTPATIENT
Start: 2024-04-06 | End: 2024-04-06

## 2024-04-06 RX ORDER — SIMETHICONE 80 MG
80 TABLET,CHEWABLE ORAL 3 TIMES DAILY PRN
Status: DISCONTINUED | OUTPATIENT
Start: 2024-04-06 | End: 2024-04-08

## 2024-04-06 RX ORDER — BENZOCAINE/MENTHOL 6 MG-10 MG
1 LOZENGE MUCOUS MEMBRANE EVERY 6 HOURS PRN
Status: DISCONTINUED | OUTPATIENT
Start: 2024-04-06 | End: 2024-04-08

## 2024-04-06 RX ORDER — AMMONIA INHALANTS 0.04 G/.3ML
0.3 INHALANT RESPIRATORY (INHALATION) AS NEEDED
Status: DISCONTINUED | OUTPATIENT
Start: 2024-04-06 | End: 2024-04-08

## 2024-04-06 RX ORDER — BISACODYL 10 MG
10 SUPPOSITORY, RECTAL RECTAL ONCE AS NEEDED
Status: DISCONTINUED | OUTPATIENT
Start: 2024-04-06 | End: 2024-04-08

## 2024-04-06 RX ORDER — ACETAMINOPHEN 500 MG
500 TABLET ORAL EVERY 6 HOURS PRN
Status: DISCONTINUED | OUTPATIENT
Start: 2024-04-06 | End: 2024-04-08

## 2024-04-06 RX ORDER — NALBUPHINE HYDROCHLORIDE 10 MG/ML
2.5 INJECTION, SOLUTION INTRAMUSCULAR; INTRAVENOUS; SUBCUTANEOUS
Status: DISCONTINUED | OUTPATIENT
Start: 2024-04-06 | End: 2024-04-06

## 2024-04-06 RX ORDER — ACETAMINOPHEN 500 MG
1000 TABLET ORAL EVERY 6 HOURS PRN
Status: DISCONTINUED | OUTPATIENT
Start: 2024-04-06 | End: 2024-04-08

## 2024-04-06 RX ORDER — ONDANSETRON 2 MG/ML
4 INJECTION INTRAMUSCULAR; INTRAVENOUS EVERY 6 HOURS PRN
Status: DISCONTINUED | OUTPATIENT
Start: 2024-04-06 | End: 2024-04-08

## 2024-04-06 RX ORDER — BUPIVACAINE HCL/0.9 % NACL/PF 0.25 %
5 PLASTIC BAG, INJECTION (ML) EPIDURAL AS NEEDED
Status: DISCONTINUED | OUTPATIENT
Start: 2024-04-06 | End: 2024-04-06

## 2024-04-06 RX ORDER — IBUPROFEN 600 MG/1
600 TABLET ORAL EVERY 6 HOURS
Status: DISCONTINUED | OUTPATIENT
Start: 2024-04-06 | End: 2024-04-08

## 2024-04-06 NOTE — PLAN OF CARE
Problem: Patient Centered Care  Goal: Patient preferences are identified and integrated in the patient's plan of care  Description: Interventions:  - What would you like us to know as we care for you? I am [place] speaking only. My  can speak some english.  - Provide timely, complete, and accurate information to patient/family  - Incorporate patient and family knowledge, values, beliefs, and cultural backgrounds into the planning and delivery of care  - Encourage patient/family to participate in care and decision-making at the level they choose  - Honor patient and family perspectives and choices  Outcome: Progressing     Problem: Patient/Family Goals  Goal: Patient/Family Long Term Goal  Description: Patient's Long Term Goal:     Interventions:  - See additional Care Plan goals for specific interventions  Outcome: Progressing  Goal: Patient/Family Short Term Goal  Description: Patient's Short Term Goal:    Interventions:   - See additional Care Plan goals for specific interventions  Outcome: Progressing     Problem: BIRTH - VAGINAL/ SECTION  Goal: Fetal and maternal status remain reassuring during the birth process  Description: INTERVENTIONS:  - Monitor vital signs  - Monitor fetal heart rate  - Monitor uterine activity  - Monitor labor progression (vaginal delivery)  - DVT prophylaxis (C/S delivery)  - Surgical antibiotic prophylaxis (C/S delivery)  Outcome: Progressing     Problem: PAIN - ADULT  Goal: Verbalizes/displays adequate comfort level or patient's stated pain goal  Description: INTERVENTIONS:  - Encourage pt to monitor pain and request assistance  - Assess pain using appropriate pain scale  - Administer analgesics based on type and severity of pain and evaluate response  - Implement non-pharmacological measures as appropriate and evaluate response  - Consider cultural and social influences on pain and pain management  - Manage/alleviate anxiety  - Utilize distraction and/or relaxation  techniques  - Monitor for opioid side effects  - Notify MD/LIP if interventions unsuccessful or patient reports new pain  - Anticipate increased pain with activity and pre-medicate as appropriate  Outcome: Progressing     Problem: ANXIETY  Goal: Will report anxiety at manageable levels  Description: INTERVENTIONS:  - Administer medication as ordered  - Teach and rehearse alternative coping skills  - Provide emotional support with 1:1 interaction with staff  Outcome: Progressing

## 2024-04-06 NOTE — LACTATION NOTE
LACTATION NOTE - MOTHER      Evaluation Type: Inpatient    Problems identified  Problems identified: Knowledge deficit    Maternal history  Maternal history: Polycystic ovarian syndrome (PCOS);Anemia    Breastfeeding goal  Breastfeeding goal: To maintain breast milk feeding per patient goal    Maternal Assessment  Bilateral Breasts: Soft;Wide spaced  Bilateral Nipples: Colostrum easily expressed;Everted  Prior breastfeeding experience (comment below): Primip  Breastfeeding Assistance: Breastfeeding assistance provided with permission    Pain assessment  Location/Comment: denies    Guidelines for use of:  Current use of pump:: not indicated  Other (comment): attempted to latch infant but infant not interested ate 2 hours ago. Mom doing STS and will call when showing hunger cues. Educated on the basics of breastfeeding with intepreter

## 2024-04-06 NOTE — H&P
[facility]  part of [facility]    History and Physical Examination      Subjective   Chief Complaint:  painful contractions     HISTORY OF PRESENT ILLNESS:        Patient is a 28 year old y/o   @ 40w0d weeks presents for labor . She notes + fetal movement, light vaginal bleeding, and  - ROM. Her prenatal course was complicated by anemia . Her prenatal care is up to date and reviewed. Patient's GBS is Negative    Lab Results   Component Value Date    GBS Positive (A) 03/15/2024    GBS Streptococcus agalactiae (Group B beta strep) (A) 03/15/2024           Past Medical History:   Diagnosis Date    COVID     History of PCOS        No past surgical history on file.    OB History    Para Term  AB Living   1 0 0 0 0 0   SAB IAB Ectopic Multiple Live Births   0 0 0 0 0         No current outpatient medications on file.    No Known Allergies    Social History     Socioeconomic History    Marital status:      Spouse name: Not on file    Number of children: Not on file    Years of education: Not on file    Highest education level: Not on file   Occupational History    Not on file   Tobacco Use    Smoking status: Never    Smokeless tobacco: Never   Substance and Sexual Activity    Alcohol use: Never    Drug use: Never    Sexual activity: Not on file   Other Topics Concern    Not on file   Social History Narrative    Not on file     Social Determinants of Health     Financial Resource Strain: Low Risk  (2024)    Financial Resource Strain     Difficulty of Paying Living Expenses: Not very hard     Med Affordability: No   Food Insecurity: No Food Insecurity (2024)    Food Insecurity     Food Insecurity: Never true   Transportation Needs: No Transportation Needs (2024)    Transportation Needs     Lack of Transportation: No   Stress: No Stress Concern Present (2024)    Stress     Feeling of Stress : No   Housing Stability: Low Risk  (2024)    Housing Stability      Housing Instability: No     Housing Instability Emergency: No         Family History   Problem Relation Age of Onset    No Known Problems Father     No Known Problems Mother     Diabetes Maternal Grandmother     No Known Problems Maternal Grandfather     No Known Problems Paternal Grandmother     No Known Problems Paternal Grandfather            Prior to Admission medications    Medication Sig Start Date End Date Taking? Authorizing Provider   Prenatal Vit-DSS-Fe Fum-FA (SE-JAZIEL 19) 29-1 MG Oral Tab  24   External/Patient, Reported   Ferrous Sulfate 325 (65 Fe) MG Oral Tab Take 1 tablet (325 mg total) by mouth every other day. 23   Sergei Edwards MD   Prenatal Vit-Fe Fumarate-FA (M-JAZIEL PLUS) 27-1 MG Oral Tab Take 1 tablet by mouth daily.  Patient not taking: Reported on 3/15/2024 10/6/23   Sergei Edwards MD   cetirizine 1 MG/ML Oral Solution Take 5 mL (5 mg total) by mouth daily.  Patient not taking: Reported on 3/15/2024    External/Patient, Reported   medroxyPROGESTERone Acetate (PROVERA) 10 MG Oral Tab Take 1 tablet by mouth for 10 days  Patient not taking: Reported on 3/1/2024 7/13/23   Los Turk MD   DULoxetine 60 MG Oral Cap DR Particles Take 1 capsule (60 mg total) by mouth daily.  Patient not taking: Reported on 3/1/2024 6/26/23   Shawna Jean MD   omeprazole 20 MG Oral Capsule Delayed Release Take 1 capsule (20 mg total) by mouth every morning before breakfast.  Patient not taking: Reported on 3/15/2024    External/Patient, Reported   Inulin (FIBER CHOICE OR) Take by mouth.  Patient not taking: Reported on 3/15/2024    External/Patient, Reported           Objective       ROS   Constitutional: Negative.  Negative for chills and fever.   Respiratory: Negative.  Negative for shortness of breath.    Cardiovascular: Negative for chest pain and palpitations.   Gastrointestinal: Negative for diarrhea, nausea and vomiting.   Genitourinary: Negative.  Negative for dysuria.    Neurological: Negative for dizziness.       Vitals:    Temp: 98.7 °F (37.1 °C)  Pulse: 88  Resp: 16  BP: 124/79     Physical Exam   Constitutional: She appears well-developed and well-nourished.   Cardiovascular: Normal rate.   Pulmonary/Chest: Effort normal.   Abdominal: Soft.   Genitourinary: Uterus normal.   Neurological: She is alert.   Skin: Skin is warm.   Psychiatric: She has a normal mood and affect.       CE:  4/80/-2/soft     .    EFM:   Baseline 130 +, Accels, - Decels, Mod LT Variability    [place]:  CTX every 4 min,        Lab Results   Component Value Date    ABO BLOOD TYPE O 04/06/2024    RH BLOOD TYPE Positive 04/06/2024    HGB 14.1 04/06/2024    .0 04/06/2024    HCT 39.9 04/06/2024    Rubella IgG Positive 10/30/2023    Treponemal Antibodies Nonreactive 02/23/2024    HIV Antigen Antibody Combo Non-Reactive 02/23/2024            ASSESSMENT AND PLAN:      Active Problems:    Group B streptococcal bacteriuria    Low serum ferritin level    Pregnant (HCC)        Patient admitted for labor.  Pain medication as desired  Good FM noted, fetal monitoring strip reviewed and reassuring.      Juju Rosas MD

## 2024-04-06 NOTE — PLAN OF CARE
Patient up to bathroom with assist x 2.  Unable to void at this time. Patient transferred to antepartum room 371 per wheelchair in stable condition with baby and personal belongings.  Accompanied by significant other and staff.  Report given to mother/baby JENN Menjivar at 0715.

## 2024-04-06 NOTE — TRIAGE
[facility]  part of [facility]      Triage Note    Shandra Rashid Patient Status:  Outpatient    1995 MRN [id number]   Location [facility] Attending Juju Rosas MD   Hosp Day # 0 PCP Brayan Ardon MD      Para:   Estimated Date of Delivery: 24  Gestation: 39w6d    Chief Complaint    R/o Labor         Allergies:  No Known Allergies    No orders of the defined types were placed in this encounter.      Lab Results   Component Value Date    WBC 8.4 2024    HGB 13.4 2024    HCT 37.8 2024    .0 2024    CREATSERUM 0.82 2023    BUN 7 2023     2023    K 3.5 2023     2023    CO2 23.0 2023    GLU 96 2023    CA 8.8 2023    ALB 3.9 2023    ALKPHO 72 2023    BILT 0.5 2023    TP 8.1 2023    AST 25 2023    ALT 37 2023    TSH 2.450 2021       Clinitek UA  Lab Results   Component Value Date    GLUUR Normal 10/30/2023    SPECGRAVITY 1.010 2024    URINECUL  10/30/2023     <10,000 cfu/ml Mixture of Gram positive organisms isolated - probable contamination.    URINECUL (A) 10/30/2023     <10,000 CFU/ML Streptococcus agalactiae (Group B beta strep)       UA  Lab Results   Component Value Date    COLORUR Light-Yellow 10/30/2023    CLARITY Clear 10/30/2023    SPECGRAVITY 1.010 2024    PROUR Negative 10/30/2023    GLUUR Normal 10/30/2023    KETUR Negative 10/30/2023    BILUR Negative 10/30/2023    BLOODURINE Negative 10/30/2023    NITRITE Negative 2024    UROBILINOGEN Normal 10/30/2023    LEUUR Negative 10/30/2023       Vitals:    24 1830   BP: 123/78   Pulse: 72       NST  Variability: Moderate           Accelerations: Yes           Decelerations: None            Baseline: 125 BPM           Uterine Irritability: No           Contractions: Irregular                    Contraction Frequency:  irregular 10-12 minutes apart                   Acoustic Stimulator: No           Nonstress Test Interpretation: Reactive           Nonstress Test Second Interpretation: Reactive          FHR Category: Category I             Additional Comments Comments: Pt is a  37  presents to triage for r/o labor per md pt was given a sve at 2/100/-3 and to be reexamined at 1 hour pt remain the same stating contraction were further apart. md contacted and per md pt to be discharged. pt educated verbally and with written education about kick count active labor and comfort tips. pt reports understaning and all questions answered     Chief Complaint   Patient presents with    R/o Labor     Reports contractions since this morning         Marianne NAIR RN  2024 7:54 PM

## 2024-04-06 NOTE — PLAN OF CARE
Pt is a 28 year old female admitted to LDR6/LDR6-A.     Chief Complaint   Patient presents with    R/o Labor     Pt reports contraction  in intensity      R/o Rom     Pt reports underwear are wet slightly        Pt is  40w0d intra-uterine pregnancy.  History obtained, consents signed. Oriented to room, staff, and plan of care.

## 2024-04-06 NOTE — L&D DELIVERY NOTE
[facility]  part of [facility]    Vaginal Delivery Note    Shandra Rashid Patient Status:  Inpatient    1995 MRN [id number]   Location [facility] Attending Juju Rosas MD   Hosp Day # 1 PCP Brayan Ardon MD     Delivery     Infant  Date of Delivery: 2024    Time of Delivery: 4:37 AM   Delivery Type: Normal spontaneous vaginal delivery     Infant Sex  Information for the patient's :  Parker Cavazos [[id number]]   male     Infant Birthweight  Information for the patient's :  Parker Cavazos [[id number]]   6 lb 3.1 oz (2.81 kg)      Presentation Vertex [1]   Position Right [3]  Occiput [1]  Anterior [1]     Apgars:  1 minute: 9                5 minutes: 9                         10 minutes:      Placenta  Date/Time of Delivery: 2024  4:42 AM    Delivery: spontaneous   Induction or Augmentation: neither   Placenta to Pathology: no  Cord Gases Submitted: no  Cord Blood Collection: yes  Cord Tissue Collection: yes  Cord Complications: none   Sponge and Needle Counts:  Verified yes      Maternal Anesthesia: local   Episiotomy/Laceration Repair  Laceration: vaginal 2nd  degree    Delivery Complications  none    Neonatologist Present: no   Status: Stable   Maternal Status: Stable   Delivery Comment:  with 2nd degree lac repaired with 2-0 vicryl with excellent hemostasis       Intake/Output   QBL:  See nursing documentation below        Juju Rosas MD  2024  4:51 AM              ErnestinaParker Ingram [[id number]]      Labor Events     labor?: No   steroids?: None  Antibiotics received during labor?: Yes  Antibiotics (enter # doses in comment): penicillin  Rupture date/time: 2024     Rupture type: SROM  Fluid color: Clear  Labor type: Spontaneous Onset of Labor       Labor Event Times    Labor onset date/time: 2024        Presentation    Presentation: Vertex  Position:  Right Occiput Anterior       Operative Delivery    Operative Vaginal Delivery: No                Shoulder Dystocia    Shoulder Dystocia: No       Anesthesia    No data filed       [place] Delivery      Head delivery date/time: 2024 04:37:07   Delivery date/time:  24 04:37:15   Delivery type: Normal spontaneous vaginal delivery    Details:            Delivery Providers       Delivery personnel:  Provider Role    Baby Nurse    Delivery Nurse             Cord    Vessels: 3 Vessels  Complications: None  Timed cord clamping: Yes  Time in sec: 30  Cord blood disposition: to lab  Gases sent?: No       Resuscitation    Method: None        Measurements      Weight: 2810 g 6 lb 3.1 oz Length: 50.8 cm     Head circum.: 30 cm              Placenta    Date/time: 2024 0442  Removal: Spontaneous  Appearance: Intact  Disposition: Discarded       Apgars    Living status: Living   Apgar Scoring Key:    0 1 2    Skin color Blue or pale Acrocyanotic Completely pink    Heart rate Absent <100 bpm >100 bpm    Reflex irritability No response Grimace Cry or active withdrawal    Muscle tone Limp Some flexion Active motion    Respiratory effort Absent Weak cry; hypoventilation Good, crying              1 Minute:  5 Minute:  10 Minute:  15 Minute:  20 Minute:      Skin color: 1  1       Heart rate: 2  2       Reflex irritablity: 2  2       Muscle tone: 2  2       Respiratory effort: 2  2       Total: 9  9          Apgars assigned by: CARMEL COATES RN   disposition: with mother       Skin to Skin    No data filed       Vaginal Count    No data filed       Lacerations    No data filed

## 2024-04-06 NOTE — PROGRESS NOTES
Pt is a 28 year old female admitted to LDR6/LDR6-A.     Chief Complaint   Patient presents with    R/o Labor     Pt reports contraction  in intensity      R/o Rom     Pt reports underwear are wet slightly        Pt is  39w6d intra-uterine pregnancy.  History obtained, consents signed. Oriented to room, staff, and plan of care.

## 2024-04-06 NOTE — PLAN OF CARE
Patient received into room 371 via wheelchair. Beside report received from Elicia LEROY RN. Patient transferred to bed without incident. Bed in locked and low position. Side rails up x 2. VSS. Fundus firm at u/u, lochia small, no clots noted. IV site unremarkable. Pain level 0/10. Baby present at bedside in open crib, ID bands checked and verified. Patient/family oriented to unit, room and call light. Call light within patient reach. Reinforced to patient to call for assistance before getting out of bed to bathroom. Plan of care reviewed. Will continue to monitor per protocol.   Problem: POSTPARTUM  Goal: Long Term Goal:Experiences normal postpartum course  Description: INTERVENTIONS:  - Assess and monitor vital signs and lab values.  - Assess fundus and lochia.  - Provide ice/sitz baths for perineum discomfort.  - Monitor healing of incision/episiotomy/laceration, and assess for signs and symptoms of infection and hematoma.  - Assess bladder function and monitor for bladder distention.  - Provide/instruct/assist with pericare as needed.  - Provide VTE prophylaxis as needed.  - Monitor bowel function.  - Encourage ambulation and provide assistance as needed.  - Assess and monitor emotional status and provide social service/psych resources as needed.  - Utilize standard precautions and use personal protective equipment as indicated. Ensure aseptic care of all intravenous lines and invasive tubes/drains.  - Obtain immunization and exposure to communicable diseases history.  Outcome: Progressing  Goal: Optimize infant feeding at the breast  Description: INTERVENTIONS:  - Initiate breast feeding within first hour after birth.   - Monitor effectiveness of current breast feeding efforts.  - Assess support systems available to mother/family.  - Identify cultural beliefs/practices regarding lactation, letdown techniques, maternal food preferences.  - Assess mother's knowledge and previous experience with breast feeding.  -  Provide information as needed about early infant feeding cues (e.g., rooting, lip smacking, sucking fingers/hand) versus late cue of crying.  - Discuss/demonstrate breast feeding aids (e.g., infant sling, nursing footstool/pillows, and breast pumps).  - Encourage mother/other family members to express feelings/concerns, and actively listen.  - Educate father/SO about benefits of breast feeding and how to manage common lactation challenges.  - Recommend avoidance of specific medications or substances incompatible with breast feeding.  - Assess and monitor for signs of nipple pain/trauma.  - Instruct and provide assistance with proper latch.  - Review techniques for milk expression (breast pumping) and storage of breast milk. Provide pumping equipment/supplies, instructions and assistance, as needed.  - Encourage rooming-in and breast feeding on demand.  - Encourage skin-to-skin contact.  - Provide LC support as needed.  - Assess for and manage engorgement.  - Provide breast feeding education handouts and information on community breast feeding support.   Outcome: Progressing  Goal: Establishment of adequate milk supply with medication/procedure interruptions  Description: INTERVENTIONS:  - Review techniques for milk expression (breast pumping).   - Provide pumping equipment/supplies, instructions, and assistance until it is safe to breastfeed infant.  Outcome: Progressing  Goal: Appropriate maternal -  bonding  Description: INTERVENTIONS:  - Assess caregiver- interactions.  - Assess caregiver's emotional status and coping mechanisms.  - Encourage caregiver to participate in  daily care.  - Assess support systems available to mother/family.  - Provide /case management support as needed.  Outcome: Progressing

## 2024-04-07 LAB
BASOPHILS # BLD AUTO: 0.05 X10(3) UL (ref 0–0.2)
BASOPHILS NFR BLD AUTO: 0.5 %
DEPRECATED RDW RBC AUTO: 43.9 FL (ref 35.1–46.3)
EOSINOPHIL # BLD AUTO: 0.06 X10(3) UL (ref 0–0.7)
EOSINOPHIL NFR BLD AUTO: 0.6 %
ERYTHROCYTE [DISTWIDTH] IN BLOOD BY AUTOMATED COUNT: 13.4 % (ref 11–15)
HCT VFR BLD AUTO: 34.6 %
HGB BLD-MCNC: 12.5 G/DL
IMM GRANULOCYTES # BLD AUTO: 0.03 X10(3) UL (ref 0–1)
IMM GRANULOCYTES NFR BLD: 0.3 %
LYMPHOCYTES # BLD AUTO: 2.53 X10(3) UL (ref 1–4)
LYMPHOCYTES NFR BLD AUTO: 25.6 %
MCH RBC QN AUTO: 32.6 PG (ref 26–34)
MCHC RBC AUTO-ENTMCNC: 36.1 G/DL (ref 31–37)
MCV RBC AUTO: 90.1 FL
MONOCYTES # BLD AUTO: 0.83 X10(3) UL (ref 0.1–1)
MONOCYTES NFR BLD AUTO: 8.4 %
NEUTROPHILS # BLD AUTO: 6.39 X10 (3) UL (ref 1.5–7.7)
NEUTROPHILS # BLD AUTO: 6.39 X10(3) UL (ref 1.5–7.7)
NEUTROPHILS NFR BLD AUTO: 64.6 %
PLATELET # BLD AUTO: 138 10(3)UL (ref 150–450)
RBC # BLD AUTO: 3.84 X10(6)UL
WBC # BLD AUTO: 9.9 X10(3) UL (ref 4–11)

## 2024-04-07 NOTE — LACTATION NOTE
04/07/24 1022   Maternal Assessment   Breastfeeding Assistance LC assistance declined at this time   Guidelines for use of:   Other (comment) LC declined at this time, encouraged to let her nurse know if lactation services are needed.

## 2024-04-07 NOTE — PROGRESS NOTES
PROGRESS NOTE        Date:     2024    Patient: Shandra Rashid       :    1995  Age:     28 year old      Gender: female  MRN:  [id number]    Subjective :  The patient is 28 year old y/o  Postpartum day #1 from a vaginal delivery.  She is doing well.  Lochia normal.  Pain controlled.  Breastfeeding without difficulty.       Objective   Physical Exam:  BP 97/57 (BP Location: Left arm)   Pulse 81   Temp 97.7 °F (36.5 °C) (Oral)   Resp 16   Wt 171 lb (77.6 kg)   LMP 05/10/2023 (Exact Date)   Breastfeeding Yes   BMI 29.35 kg/m²     Intake/Output Summary (Last 24 hours) at 2024 0855  Last data filed at 2024 1400  Gross per 24 hour   Intake --   Output 500 ml   Net -500 ml     Abdomen - soft, ND, NT  Fundus -firm, NT  Lower Extremities - NT    Result Data:  Lab Results   Component Value Date    WBC 9.9 2024    RBC 3.84 2024    HGB 12.5 2024    HCT 34.6 (L) 2024    .0 (L) 2024    GLU 96 2023    BUN 7 2023     2023    K 3.5 2023     2023    CA 8.8 2023    CO2 23.0 2023     Abnormal Labs Reviewed   CBC W/ DIFFERENTIAL - Abnormal; Notable for the following components:       Result Value    WBC 13.2 (*)     Neutrophil Absolute Prelim 10.79 (*)     Neutrophil Absolute 10.79 (*)     All other components within normal limits   CBC W/ DIFFERENTIAL - Abnormal; Notable for the following components:    HCT 34.6 (*)     .0 (*)     All other components within normal limits             Assessment and Plan:    Active Problems:    Group B streptococcal bacteriuria    Low serum ferritin level    Pregnant (HCC)    Vaginal delivery (HCC)    Iron deficiency anemia secondary to inadequate dietary iron intake        PPD # 1  Cont PP care.  Ambulate.  Home tomorrow       Juju Rosas MD

## 2024-04-07 NOTE — PLAN OF CARE
Problem: PAIN - ADULT  Goal: Verbalizes/displays adequate comfort level or patient's stated pain goal  Description: INTERVENTIONS:  - Encourage pt to monitor pain and request assistance  - Assess pain using appropriate pain scale  - Administer analgesics based on type and severity of pain and evaluate response  - Implement non-pharmacological measures as appropriate and evaluate response  - Consider cultural and social influences on pain and pain management  - Manage/alleviate anxiety  - Utilize distraction and/or relaxation techniques  - Monitor for opioid side effects  - Notify MD/LIP if interventions unsuccessful or patient reports new pain  - Anticipate increased pain with activity and pre-medicate as appropriate  Outcome: Progressing     Problem: ANXIETY  Goal: Will report anxiety at manageable levels  Description: INTERVENTIONS:  - Administer medication as ordered  - Teach and rehearse alternative coping skills  - Provide emotional support with 1:1 interaction with staff  Outcome: Progressing     Problem: POSTPARTUM  Goal: Long Term Goal:Experiences normal postpartum course  Description: INTERVENTIONS:  - Assess and monitor vital signs and lab values.  - Assess fundus and lochia.  - Provide ice/sitz baths for perineum discomfort.  - Monitor healing of incision/episiotomy/laceration, and assess for signs and symptoms of infection and hematoma.  - Assess bladder function and monitor for bladder distention.  - Provide/instruct/assist with pericare as needed.  - Provide VTE prophylaxis as needed.  - Monitor bowel function.  - Encourage ambulation and provide assistance as needed.  - Assess and monitor emotional status and provide social service/psych resources as needed.  - Utilize standard precautions and use personal protective equipment as indicated. Ensure aseptic care of all intravenous lines and invasive tubes/drains.  - Obtain immunization and exposure to communicable diseases history.  Outcome:  Progressing  Goal: Optimize infant feeding at the breast  Description: INTERVENTIONS:  - Initiate breast feeding within first hour after birth.   - Monitor effectiveness of current breast feeding efforts.  - Assess support systems available to mother/family.  - Identify cultural beliefs/practices regarding lactation, letdown techniques, maternal food preferences.  - Assess mother's knowledge and previous experience with breast feeding.  - Provide information as needed about early infant feeding cues (e.g., rooting, lip smacking, sucking fingers/hand) versus late cue of crying.  - Discuss/demonstrate breast feeding aids (e.g., infant sling, nursing footstool/pillows, and breast pumps).  - Encourage mother/other family members to express feelings/concerns, and actively listen.  - Educate father/SO about benefits of breast feeding and how to manage common lactation challenges.  - Recommend avoidance of specific medications or substances incompatible with breast feeding.  - Assess and monitor for signs of nipple pain/trauma.  - Instruct and provide assistance with proper latch.  - Review techniques for milk expression (breast pumping) and storage of breast milk. Provide pumping equipment/supplies, instructions and assistance, as needed.  - Encourage rooming-in and breast feeding on demand.  - Encourage skin-to-skin contact.  - Provide LC support as needed.  - Assess for and manage engorgement.  - Provide breast feeding education handouts and information on community breast feeding support.   Outcome: Progressing  Goal: Establishment of adequate milk supply with medication/procedure interruptions  Description: INTERVENTIONS:  - Review techniques for milk expression (breast pumping).   - Provide pumping equipment/supplies, instructions, and assistance until it is safe to breastfeed infant.  Outcome: Progressing  Goal: Appropriate maternal -  bonding  Description: INTERVENTIONS:  - Assess caregiver-  interactions.  - Assess caregiver's emotional status and coping mechanisms.  - Encourage caregiver to participate in  daily care.  - Assess support systems available to mother/family.  - Provide /case management support as needed.  Outcome: Progressing

## 2024-04-08 VITALS
BODY MASS INDEX: 29 KG/M2 | RESPIRATION RATE: 16 BRPM | DIASTOLIC BLOOD PRESSURE: 63 MMHG | WEIGHT: 171 LBS | SYSTOLIC BLOOD PRESSURE: 107 MMHG | HEART RATE: 65 BPM | TEMPERATURE: 98 F

## 2024-04-08 RX ORDER — ACETAMINOPHEN 500 MG
500 TABLET ORAL EVERY 6 HOURS PRN
Qty: 30 TABLET | Refills: 0 | Status: SHIPPED | COMMUNITY
Start: 2024-04-08

## 2024-04-08 RX ORDER — IBUPROFEN 600 MG/1
600 TABLET ORAL EVERY 6 HOURS
Qty: 30 TABLET | Refills: 0 | Status: SHIPPED | COMMUNITY
Start: 2024-04-08

## 2024-04-08 NOTE — DISCHARGE SUMMARY
[facility]  part of [facility]    OB/GYNE Postpartum Discharge Summary      Shandra Rashid Patient Status:  Inpatient    1995 MRN [id number]   Location [facility] 3SE Attending Juju Rosas MD   Hosp Day # 3 PCP Brayan Ardon MD              Date:     2024    Patient:  Shandra Rashid       :     1995  Age:      28 year old      Gender:  female  MRN:   [id number]  Admit Date:  2024   Discharge Date:  2024     Subjective :  The patient 28 year old y/o  is Postpartum day #2 from a vaginal delivery.  She is doing well.  Lochia normal.  Pain controlled.  Breastfeeding without difficulty.       Objective   Physical Exam:  /63 (BP Location: Right arm)   Pulse 65   Temp 98 °F (36.7 °C) (Oral)   Resp 16   Wt 171 lb (77.6 kg)   LMP 05/10/2023 (Exact Date)   Breastfeeding Yes   BMI 29.35 kg/m²   No intake or output data in the 24 hours ending 24 0933  Abdomen - soft, ND, NT  Fundus -firm, NT  Lower Extremities - NT    Result Data:  Lab Results   Component Value Date    WBC 9.9 2024    RBC 3.84 2024    HGB 12.5 2024    HCT 34.6 (L) 2024    .0 (L) 2024    GLU 96 2023    BUN 7 2023     2023    K 3.5 2023     2023    CA 8.8 2023    CO2 23.0 2023     Abnormal Labs Reviewed   CBC W/ DIFFERENTIAL - Abnormal; Notable for the following components:       Result Value    WBC 13.2 (*)     Neutrophil Absolute Prelim 10.79 (*)     Neutrophil Absolute 10.79 (*)     All other components within normal limits   CBC W/ DIFFERENTIAL - Abnormal; Notable for the following components:    HCT 34.6 (*)     .0 (*)     All other components within normal limits                Assessment and Plan:    Active Problems:    Group B streptococcal bacteriuria    Low serum ferritin level    Pregnant (HCC)    Vaginal delivery (HCC)    Iron deficiency  anemia secondary to inadequate dietary iron intake        PPD # 2  Cont PP care.  Ambulate.  Discharge home - f/u 2-3 wks for PP visit  Discharge Condition - Stable   Call office if heavy bleeding soaking a pad in <1hr, fever, problems breastfeeding or any depression issues.      Juju Rosas MD

## 2024-04-08 NOTE — PLAN OF CARE
Problem: PAIN - ADULT  Goal: Verbalizes/displays adequate comfort level or patient's stated pain goal  Description: INTERVENTIONS:  - Encourage pt to monitor pain and request assistance  - Assess pain using appropriate pain scale  - Administer analgesics based on type and severity of pain and evaluate response  - Implement non-pharmacological measures as appropriate and evaluate response  - Consider cultural and social influences on pain and pain management  - Manage/alleviate anxiety  - Utilize distraction and/or relaxation techniques  - Monitor for opioid side effects  - Notify MD/LIP if interventions unsuccessful or patient reports new pain  - Anticipate increased pain with activity and pre-medicate as appropriate  Outcome: Completed     Problem: ANXIETY  Goal: Will report anxiety at manageable levels  Description: INTERVENTIONS:  - Administer medication as ordered  - Teach and rehearse alternative coping skills  - Provide emotional support with 1:1 interaction with staff  Outcome: Completed     Problem: POSTPARTUM  Goal: Long Term Goal:Experiences normal postpartum course  Description: INTERVENTIONS:  - Assess and monitor vital signs and lab values.  - Assess fundus and lochia.  - Provide ice/sitz baths for perineum discomfort.  - Monitor healing of incision/episiotomy/laceration, and assess for signs and symptoms of infection and hematoma.  - Assess bladder function and monitor for bladder distention.  - Provide/instruct/assist with pericare as needed.  - Provide VTE prophylaxis as needed.  - Monitor bowel function.  - Encourage ambulation and provide assistance as needed.  - Assess and monitor emotional status and provide social service/psych resources as needed.  - Utilize standard precautions and use personal protective equipment as indicated. Ensure aseptic care of all intravenous lines and invasive tubes/drains.  - Obtain immunization and exposure to communicable diseases history.  4/8/2024 0948 by  Nesha Rosas, RN  Outcome: Completed  4/8/2024 0948 by Nesha Rosas, RN  Outcome: Progressing  Goal: Optimize infant feeding at the breast  Description: INTERVENTIONS:  - Initiate breast feeding within first hour after birth.   - Monitor effectiveness of current breast feeding efforts.  - Assess support systems available to mother/family.  - Identify cultural beliefs/practices regarding lactation, letdown techniques, maternal food preferences.  - Assess mother's knowledge and previous experience with breast feeding.  - Provide information as needed about early infant feeding cues (e.g., rooting, lip smacking, sucking fingers/hand) versus late cue of crying.  - Discuss/demonstrate breast feeding aids (e.g., infant sling, nursing footstool/pillows, and breast pumps).  - Encourage mother/other family members to express feelings/concerns, and actively listen.  - Educate father/SO about benefits of breast feeding and how to manage common lactation challenges.  - Recommend avoidance of specific medications or substances incompatible with breast feeding.  - Assess and monitor for signs of nipple pain/trauma.  - Instruct and provide assistance with proper latch.  - Review techniques for milk expression (breast pumping) and storage of breast milk. Provide pumping equipment/supplies, instructions and assistance, as needed.  - Encourage rooming-in and breast feeding on demand.  - Encourage skin-to-skin contact.  - Provide LC support as needed.  - Assess for and manage engorgement.  - Provide breast feeding education handouts and information on community breast feeding support.   Outcome: Completed  Goal: Establishment of adequate milk supply with medication/procedure interruptions  Description: INTERVENTIONS:  - Review techniques for milk expression (breast pumping).   - Provide pumping equipment/supplies, instructions, and assistance until it is safe to breastfeed infant.  Outcome: Completed  Goal: Appropriate maternal -   bonding  Description: INTERVENTIONS:  - Assess caregiver- interactions.  - Assess caregiver's emotional status and coping mechanisms.  - Encourage caregiver to participate in  daily care.  - Assess support systems available to mother/family.  - Provide /case management support as needed.  2024 0948 by Nesha Rosas, RN  Outcome: Completed  2024 0948 by Nesha Rosas, RN  Outcome: Progressing

## 2024-04-17 RX ORDER — IBUPROFEN 600 MG/1
600 TABLET ORAL EVERY 6 HOURS
Qty: 30 TABLET | Refills: 0 | Status: CANCELLED | OUTPATIENT
Start: 2024-04-17

## 2024-04-17 RX ORDER — ACETAMINOPHEN 500 MG
500 TABLET ORAL EVERY 6 HOURS PRN
Qty: 30 TABLET | Refills: 0 | Status: CANCELLED | OUTPATIENT
Start: 2024-04-17

## 2024-04-17 NOTE — TELEPHONE ENCOUNTER
From: Shandra Rashid  To: Office of Juju Rosas  Sent: 4/16/2024 5:29 PM CDT  Subject: Medication Renewal Request    Refills have been requested for the following medications:     acetaminophen 500 MG Oral Tab [Juju Rosas]     ibuprofen 600 MG Oral Tab [Juju Rosas]    Preferred pharmacy: [id number] [address] AT [facility] [address], [phone], [phone]

## 2024-05-07 ENCOUNTER — OFFICE VISIT (OUTPATIENT)
Dept: OBGYN CLINIC | Facility: CLINIC | Age: 29
End: 2024-05-07

## 2024-05-07 VITALS — SYSTOLIC BLOOD PRESSURE: 114 MMHG | WEIGHT: 146.25 LBS | DIASTOLIC BLOOD PRESSURE: 76 MMHG | BODY MASS INDEX: 25 KG/M2

## 2024-05-07 DIAGNOSIS — M62.89 PELVIC FLOOR DYSFUNCTION: ICD-10-CM

## 2024-05-07 PROCEDURE — 3074F SYST BP LT 130 MM HG: CPT | Performed by: OBSTETRICS & GYNECOLOGY

## 2024-05-07 PROCEDURE — 3078F DIAST BP <80 MM HG: CPT | Performed by: OBSTETRICS & GYNECOLOGY

## 2024-05-07 RX ORDER — METOCLOPRAMIDE 10 MG/1
TABLET ORAL
Qty: 63 TABLET | Refills: 0 | Status: SHIPPED | OUTPATIENT
Start: 2024-05-07 | End: 2024-06-11

## 2024-05-07 NOTE — PROGRESS NOTES
Postpartum Care.      HPI:  Shandra Rashid is a 28 year old  female who presents for a postpartum visit.    Delivery Date: 2024    Delivering Physician: Ralph    Type of Delivery: MARCELINA    Gestational Age: 40w0d    Prenatal Complications: none    Delivery Complications: Variable deceleration    Perineal Laceration: 2nd degree repaired    Bleeding: light    Incision: n/a    Rhogam: n/a    Rubella Status/Vaccine: Immune    Baby's Name: Ap Do    Baby's Gender: Boy    Baby's Birth Weight: 6lb 3.1 oz    Baby's Health: healthy    Breast or Formula Feeding: both    Contraception: would like to discuss    Depression Screen: 11    Last Pap: 2022 Annual exam appt made? ,     Concerns: reports she has not been able to produce enough has been having to supplement with formula but baby does not want formula as much since yesterday    1. Postpartum care and examination (HCC)    /76   Wt 146 lb 4.4 oz (66.3 kg)   LMP 05/10/2023 (Exact Date)   Wt Readings from Last 3 Encounters:   24 146 lb 4.4 oz (66.3 kg)   24 171 lb (77.6 kg)   24 171 lb (77.6 kg)     Health Maintenance   Topic Date Due    Annual Physical  Never done    COVID-19 Vaccine (3 -  season) 2023    Annual Depression Screening  2024    Influenza Vaccine (Season Ended) 10/01/2024    Pap Smear  2025    DTaP,Tdap,and Td Vaccines (2 - Td or Tdap) 2034    Pneumococcal Vaccine: Birth to 64yrs  Aged Out           Review of Systems   General: Not Present- Anorexia, Fatigue, Fever, Weight Gain > 10lbs. and Weight Loss > 10lbs..  HEENT: Not Present- Headache, Visual Disturbances, Vertigo and Bleeding Gums.  Breast: Not Present- Breast Mass, Breast Pain, Nipple Discharge and Nipple Pain.  Gastrointestinal: Not Present- Abdominal Pain, Change in Bowel Habits, Nausea and Vomiting.  Female Genitourinary: Not Present- Dysuria, Flank Pain, Frequency, Hematuria, Incontinence,  Pelvic Pain, Urgency, Vaginal Bleeding and Vaginal Discharge.  Psychiatric: Not Present- Anxiety, Change in Sleep Pattern and Depression.  Endocrine: Not Present- Libido Change.  Hematology: Not Present- Anemia, Easy Bruising, Prolonged Bleeding and Spontaneous Bleeding.  All other systems negative       Physical Exam   The physical exam findings are as follows:       General   Mental Status - Alert. General Appearance - Cooperative. Not in acute distress or Sickly. Orientation - Oriented X4. Build & Nutrition - Well nourished. Hydration - Well hydrated.      Chest and Lung Exam   Auscultation:   Breath sounds: - Normal.      Breast   Nipples: Discharge - Bilateral - No Colostrum - thick or Colostrum - yellow.  Breast - Bilateral - Normal.      Cardiovascular   Auscultation: Rhythm - Regular. Heart Sounds - Normal heart sounds.      Abdomen   Inspection: - Inspection Normal.  Palpation/Percussion: Palpation and Percussion of the abdomen reveal - Non Tender and No Palpable abdominal masses.  Auscultation: Auscultation of the abdomen reveals - Bowel sounds normal.      Female Genitourinary     External Genitalia   Perineum - Normal. Bartholin's Gland - Bilateral - Normal.  Introitus: Characteristics - Normal. Discharge - None.  Labia Majora: Characteristics - Bilateral - Normal.  Labia Minora: Characteristics - Bilateral - Normal.  Urethra: Characteristics - Normal. Discharge - None.  [place] Gland - Bilateral - Normal.  Vulva: Characteristics - Normal. Lesions - None.    Speculum & Bimanual   Vagina:   Vaginal Wall: - Normal.  Vaginal Lesions - None. Vaginal Mucosa - Normal.  Cervix: Characteristics - No Motion tenderness. Discharge - None.  Uterus: Characteristics - Normal. Fundal Height - Just above symphysis pubis.  Adnexa: Characteristics - Bilateral - Normal. Masses - No Adnexal Masses.  Pelvic Floor Muscles - 0/5.      Peripheral Vascular   Lower Extremity: Inspection - Bilateral - Inspection Normal.  Palpation:  Edema - Bilateral - No edema.      Neurologic   Mental Status: - Normal.    Discussed postpartum care, expectation for menses, contraception, and pregnancy timing.  Discussed with patient activity level postpartum and when she can resume all normal activities.  Encourage patient to continue her prenatal vitamin until done breast-feeding.  Patient desires condoms at this point for birth control.  Patient scheduled for her routine GYN exam.    Considering iud - info given  Pelvic floor pt encouraged due to symptoms  Reglan for breast milk production       1. Postpartum care and examination (HCC)

## 2024-05-20 ENCOUNTER — TELEPHONE (OUTPATIENT)
Dept: OBGYN CLINIC | Facility: CLINIC | Age: 29
End: 2024-05-20

## 2024-05-21 NOTE — TELEPHONE ENCOUNTER
Family Medical Leave Act forms received in forms department missing pages. Will need to download full packed from [employer] website or review originals. Sent [employer] message for missing Authorization.

## 2024-05-22 NOTE — TELEPHONE ENCOUNTER
Called patient and advised we are missing pages for FMLA forms for spouse- Patient took down our fax number and email address to send forms once she speaks to  about missing pages

## 2024-05-24 NOTE — TELEPHONE ENCOUNTER
Forms placed on hold. [employer] message sent reminding patient we still need complete packet of Family Medical Leave Act forms.

## 2024-06-26 ENCOUNTER — OFFICE VISIT (OUTPATIENT)
Dept: OBGYN CLINIC | Facility: CLINIC | Age: 29
End: 2024-06-26

## 2024-06-26 VITALS
WEIGHT: 130 LBS | BODY MASS INDEX: 22.2 KG/M2 | SYSTOLIC BLOOD PRESSURE: 109 MMHG | HEIGHT: 64 IN | DIASTOLIC BLOOD PRESSURE: 74 MMHG | HEART RATE: 63 BPM

## 2024-06-26 DIAGNOSIS — R10.2 PELVIC PAIN: ICD-10-CM

## 2024-06-26 DIAGNOSIS — Z01.419 NORMAL GYNECOLOGIC EXAMINATION: Primary | ICD-10-CM

## 2024-06-26 PROCEDURE — 3074F SYST BP LT 130 MM HG: CPT | Performed by: OBSTETRICS & GYNECOLOGY

## 2024-06-26 PROCEDURE — 99395 PREV VISIT EST AGE 18-39: CPT | Performed by: OBSTETRICS & GYNECOLOGY

## 2024-06-26 PROCEDURE — 3078F DIAST BP <80 MM HG: CPT | Performed by: OBSTETRICS & GYNECOLOGY

## 2024-06-26 PROCEDURE — 3008F BODY MASS INDEX DOCD: CPT | Performed by: OBSTETRICS & GYNECOLOGY

## 2024-06-26 NOTE — PROGRESS NOTES
Shandra Rashid is a 28 year old female  Patient's last menstrual period was 05/10/2023 (exact date).   Chief Complaint   Patient presents with    Annual     Pelvic pain to lower back and legs.Pain in vaginal area.    Breast feeding. Pt here with c/o leg pain and pelvic pain. Pt c/o bilateral pelvic pain front and back.    OBSTETRICS HISTORY:     OB History    Para Term  AB Living   1 1 1 0 0 1   SAB IAB Ectopic Multiple Live Births   0 0 0 0 1      # Outcome Date GA Lbr Fabian/2nd Weight Sex Type Anes PTL Lv   1 Term 24 40w0d 04:02 / 01:10 6 lb 3.1 oz (2.81 kg) M NORMAL SPONT None N DOMINIQUE      Complications: Variable decelerations       GYNE HISTORY:         Menarche: 10 years old (10/9/2023 10:01 AM)  Period Cycle (Days): 30-35 days (10/9/2023 10:01 AM)  Period Duration (Days): 7-10 days (10/9/2023 10:01 AM)  Period Flow: moderate to heavy (10/9/2023 10:01 AM)  Use of Birth Control (if yes, specify type): OCP (10/9/2023 10:01 AM)  Date When Birth Control Last Used: 2023 (10/9/2023 10:01 AM)  Hx Prior Abnormal Pap: No (10/9/2023 10:01 AM)  Pap Date: 22 (8/15/2023 12:04 PM)  Pap Result Notes: normal (8/15/2023 12:04 PM)  Follow Up Recommendation: 3 years (8/15/2023 12:04 PM)        Latest Ref Rng & Units 2022     4:41 PM 2021     3:44 PM   RECENT PAP RESULTS   Thinprep Pap Negative for intraepithelial lesion or malignancy Negative for intraepithelial lesion or malignancy  Negative for intraepithelial lesion or malignancy    HPV Negative Negative  Negative          MEDICAL HISTORY:     Past Medical History:    COVID    History of PCOS       SURGICAL HISTORY:     History reviewed. No pertinent surgical history.    SOCIAL HISTORY:     Social History     Socioeconomic History    Marital status:    Tobacco Use    Smoking status: Never     Passive exposure: Never    Smokeless tobacco: Never   Substance and Sexual Activity    Alcohol use: Never    Drug use: Never     Sexual activity: Yes     Partners: Male     Social Determinants of Health     Financial Resource Strain: Low Risk  (2024)    Financial Resource Strain     Difficulty of Paying Living Expenses: Not very hard     Med Affordability: No   Food Insecurity: No Food Insecurity (2024)    Food Insecurity     Food Insecurity: Never true   Transportation Needs: No Transportation Needs (2024)    Transportation Needs     Lack of Transportation: No   Stress: No Stress Concern Present (2024)    Stress     Feeling of Stress : No   Housing Stability: Low Risk  (2024)    Housing Stability     Housing Instability: No     Housing Instability Emergency: No        FAMILY HISTORY:     Family History   Problem Relation Age of Onset    No Known Problems Father     No Known Problems Mother     Diabetes Maternal Grandmother     No Known Problems Maternal Grandfather     No Known Problems Paternal Grandmother     No Known Problems Paternal Grandfather        MEDICATIONS:       Current Outpatient Medications:     docusate sodium 50 MG Oral Cap, Take 1 capsule (50 mg total) by mouth 2 (two) times daily., Disp: , Rfl:     acetaminophen 500 MG Oral Tab, Take 1 tablet (500 mg total) by mouth every 6 (six) hours as needed for Pain. (Patient not taking: Reported on 2024), Disp: 30 tablet, Rfl: 0    ibuprofen 600 MG Oral Tab, Take 1 tablet (600 mg total) by mouth every 6 (six) hours. (Patient not taking: Reported on 2024), Disp: 30 tablet, Rfl: 0    Prenatal Vit-DSS-Fe Fum-FA (SE-JAZIEL 19) 29-1 MG Oral Tab, , Disp: , Rfl:     Ferrous Sulfate 325 (65 Fe) MG Oral Tab, Take 1 tablet (325 mg total) by mouth every other day. (Patient not taking: Reported on 2024), Disp: 45 tablet, Rfl: 1    Prenatal Vit-Fe Fumarate-FA (M- PLUS) 27-1 MG Oral Tab, Take 1 tablet by mouth daily., Disp: 90 tablet, Rfl: 3    cetirizine 1 MG/ML Oral Solution, Take 5 mL (5 mg total) by mouth daily. (Patient not taking: Reported on  3/15/2024), Disp: , Rfl:     medroxyPROGESTERone Acetate (PROVERA) 10 MG Oral Tab, Take 1 tablet by mouth for 10 days (Patient not taking: Reported on 3/1/2024), Disp: 10 tablet, Rfl: 0    DULoxetine 60 MG Oral Cap DR Particles, Take 1 capsule (60 mg total) by mouth daily. (Patient not taking: Reported on 3/1/2024), Disp: 30 capsule, Rfl: 1    omeprazole 20 MG Oral Capsule Delayed Release, Take 1 capsule (20 mg total) by mouth every morning before breakfast. (Patient not taking: Reported on 3/15/2024), Disp: , Rfl:     Inulin (FIBER CHOICE OR), Take by mouth. (Patient not taking: Reported on 3/15/2024), Disp: , Rfl:     ALLERGIES:     No Known Allergies      REVIEW OF SYSTEMS:     Constitutional:    denies fever / chills  Eyes:     denies blurred or double vision  Cardiovascular:  denies chest pain or palpitations  Respiratory:    denies shortness of breath  Gastrointestinal:  denies severe abdominal pain, frequent diarrhea or constipation, nausea / vomiting  Genitourinary:    denies dysuria, bothersome incontinence  Skin/Breast:   denies any breast pain, lumps, or discharge  Neurological:    denies frequent severe headaches  Psychiatric:   denies depression or anxiety, thoughts of harming self or others  Heme/Lymph:    denies easy bruising or bleeding      PHYSICAL EXAM:   Blood pressure 109/74, pulse 63, height 5' 4\" (1.626 m), weight 130 lb (59 kg), last menstrual period 05/10/2023, currently breastfeeding.  Constitutional:  well developed, well nourished  Head/Face:  normocephalic  Neck/Thyroid: thyroid symmetric, no thyromegaly, no nodules, no adenopathy  Lymphatic: no abnormal supraclavicular or axillary adenopathy is noted  Respiratory:      chest wall symmetric and nontender on palpation, clear to asculation bilateral, no wheezing, rales, ronchi, and resonance normal upon percussion  Cardiovascular: chest normal in appearance, regular rate and rhythm, no murmurs, PMI palpated midclavicular line  Breast:    normal bilaterally without palpable masses, tenderness, asymmetry, nipple discharge, nipple retraction or skin changes bilaterally  Abdomen:   soft, nontender, nondistended, no masses  Skin/Hair:  no unusual rashes or bruises  Extremities:  no edema, no cyanosis, non tender bilaterally  Psychiatric:   oriented to time, place, person and situation. Appropriate mood and affect    Pelvic Exam:  External Genitalia:  normal appearance, hair distribution, and no lesions  Urethral Meatus:   normal in size, location, without lesions   Bladder:    no fullness, masses or tenderness  Vagina:    normal appearance without lesions, no abnormal discharge  Cervix:    No lesions, normal friability   Uterus:    normal in size, 8 wk sized, normal contour, position, mobility, without  motion tenderness  Adnexa:   normal without masses or tenderness  Perineum:   normal  Anus: no hemorroids         ASSESSMENT & PLAN:     Shandra was seen today for annual.    Diagnoses and all orders for this visit:    Normal gynecologic examination  -     ThinPrep Pap with HPV Reflex, Chlamydia/GC; Future  -     Chlamydia/Gc Amplification; Future  Nutrition, weight screening and exercise were discussed with the patient.  Exercise should encompass approximately 150 minutes/week.  Self breast exam counseling was also given.  I advised the patient to avoid tobacco, drugs and alcohol.  Influenza vaccine was offered if seasonally appropriate.  HPV and STD prevention counseling was given.  Health maintenance checklist  was reviewed including Pap smear, cervical cultures, and mammogram screening if age-appropriate.  Appropriate follow-up scheduling was discussed with the patient.    Pap done  Declined physical therapy  Pain- pelvic bone. Xray and pt    FOLLOW-UP     Return in about 1 year (around 6/26/2025) for Annual exam.      Massimo Heard MD  6/26/2024

## 2024-06-27 LAB
C TRACH DNA SPEC QL NAA+PROBE: NEGATIVE
N GONORRHOEA DNA SPEC QL NAA+PROBE: NEGATIVE

## 2024-07-01 LAB — HPV E6+E7 MRNA CVX QL NAA+PROBE: NEGATIVE

## 2024-07-14 ENCOUNTER — HOSPITAL ENCOUNTER (OUTPATIENT)
Dept: GENERAL RADIOLOGY | Age: 29
End: 2024-07-14
Attending: OBSTETRICS & GYNECOLOGY
Payer: COMMERCIAL

## 2024-07-14 ENCOUNTER — HOSPITAL ENCOUNTER (OUTPATIENT)
Dept: GENERAL RADIOLOGY | Age: 29
Discharge: HOME | End: 2024-07-14
Attending: OBSTETRICS & GYNECOLOGY
Payer: COMMERCIAL

## 2024-07-14 DIAGNOSIS — R10.2 PELVIC PAIN: ICD-10-CM

## 2024-07-14 PROCEDURE — 73502 X-RAY EXAM HIP UNI 2-3 VIEWS: CPT | Performed by: OBSTETRICS & GYNECOLOGY

## 2024-09-25 ENCOUNTER — TELEPHONE (OUTPATIENT)
Dept: PHYSICAL THERAPY | Facility: HOSPITAL | Age: 29
End: 2024-09-25

## 2024-09-27 NOTE — PROGRESS NOTES
PELVIC FLOOR EVALUATION:   Referring Physician: Dr. Rosas  Diagnosis: Pelvic floor dysfunction (M62.89)       Date of order: 5/27/24 Date of Service: 10/1/24     Patient verbally consented to be seen for PT evaluation and treatment  Precautions:  None  Language line: Danny [id number]    PATIENT SUMMARY   Shandra Rashid is a 29 year old female  who presents to therapy today with complaints of pain on the pelvis area and pain on the inner thigh and groin area. Pt states that she also issues with sexual intercourse in the vagina , after the birth she has this lump. She has not been able to have sexual relations due to pain.pt states that she could not walk and walk fast when due pain        pt reports pain on  above area    Pt describes pain level: current 4/10. Worst pain :6-/10    The Marinoff Dyspareunia Scale (MDS) is a four-point scale that measures the pain limitations that may prevent someone from having sexual intercourse:   0: No limitations   1: Discomfort, but doesn't prevent intercourse   2: Frequently prevents intercourse   3: Completely prevents intercourse      How has your lifestyle/quality of life been changed because of this issue : has not been able to have sexual relations since giving birth, unable to walk and run like before due to pain , doing the chores at home due to pain as she is limited in standing    History of condition/Previous treatments :pt states that her condition started 15 days after she gave birth , which was on April. No issues prior. No formal treatment    Past medical history was reviewed with Shandra. Significant findings include   Past Medical History:    COVID    History of PCOS        Shandra describes prior level of function pt states that she  was fine painfree and regular exercises is running.     Occupation/Activities: not working    Pt goals include to go back to previous life due to a lot of limitations due to pain .      Obstetrical/Gynecological  history:    Pregnant Now: No  Method of contraceptions : none   Last menstrual period:may 10,2023   Painful periods :No   Heavy periods ::  yes  last 2021 where she had heavy period x  2 months   1/ Para 1, ages:  51/2 mos, (+)  breastfeeding/breast fed  Delivery methods: vaginal  delivery  Complications during pregnancy/delivery: none      Vaginal dryness : No  Abdominal/Vaginal Pressure complaints:No heaviness on lower L abdomen   Organ falling out:No       URINARY HABITS    Stress Urinary Incontinence : No  Events associated with the onset of urinary complaints: NA  Amount of leakage: NA  Do you ever leak urine without knowing it?  No   No activity:    N/A    Urge Urinary Incontinence : No  Amount of leakage: NA   Triggers :  No     Urinary Frequency:    How often normal frequency     Urine Stream: pressure on the front,   Amount: normal    Painful urination : No :some sensation of burning  Trouble emptying bladder: completely :No   Post void dribble: No   Urine Stop test: Yes   Hovering: No   Nocturia: Yes  2 x at night  Empty bladder just in case: No     ADDITIONAL INFO:    Pad use:  underwear   Change frequency: 1 x a day  Fluid Intake: water: 2 liters  Bladder irritants: juice      BOWEL HABITS    Types of symptoms: Constipation   Frequency of bowel movements: 2 x a week  Stool consistency: [place] Stool Scale: 2 for months and had bleeding  Do you strain with defecation: Yes   Laxative/Stool softener use: No    SEXUAL HEALTH STATUS    History of Sexual Abuse: No   Sexual [place] Status: Yes   Pain with initial and/or deep or pain after sexual activity penetration: Yes both initial and deep, unable to have sexual relations,   Pain lasting pain after sexual activity :N/A has not been able to complete sexual relations  Orgasm status: able  to N/A   Pain with pelvic exam: Yes     Types of symptoms: burning and nocturia ;abdominal pain, vaginal pain, and constipation; LE pain , dyspareunia      ASSESSMENT      Shandra presents to physical therapy evaluation with primary c/o pain on the pelvis area and pain on the inner thigh and groin area. Pt states that she also issues with sexual intercourse in the vagina , after the birth she has this lump. She has not been able to have sexual relations due to pain.pt states that she could not walk and walk fast when due pain    .   Current reported symptoms include: burning and nocturia ;abdominal pain, vaginal pain, and constipation;  LE pain dyspareunia       Functional deficits include but are not limited to has not been able to have sexual relations since giving birth, unable to walk and run like before due to pain , doing the chores at home due to pain as she is limited in standing  .     The results of the objective tests and measures show joint mobility, motor function, muscle performance, muscle endurance, range of motion, coordination, and balance  involving the PFM   increased tone  noted on PF and pain upon palpation so PERF could not be assessed at this time as noted below, areas specified indicating  decreased strength, endurance and coordination.    PT will need to further assess   lumbopelvic region  and will revise goals accordingly.       Pt and PT discussed evaluation findings, pathology, POC and HEP.  Pt voiced understanding and performs HEP correctly without reported pain. Skilled Pelvic Physical Therapy is medically necessary to address the above impairments and reach functional goals.    OBJECTIVE:   Informed consent for internal pelvic evaluation given: Yes   External assessment of AROM on Bilateral hip reveals : pain with hip flexion  and hip abd motions  Internal Examination     Pelvic clock assessment:WNL  in all points of 9,3,8,4,7,5    Perineal observation : WNL  Contract : decreased  Bear down ;unable  Cough: NT    Mons pubis: WNL  Labia majora: WNL  Labia minora: WNL  Urethral meatus: WNL  Introitus: WNL  Perineal body:  WNL      Internal Palpation Left Right Comments   Superficial Transverse perineal moderate restriction moderate restriction and severe restriction    Bulbocavernosus moderate restriction severe restriction and pain    Ischiocavernosus moderate restriction moderate restriction    Deep Transverse Perineal moderate restriction severe restriction, trigger point, and pain    Compress Urethrae/Spinc. Urethrovaginalis   not tested not tested    Pubococcygeus/Pubovaginalis moderate restriction severe restriction and pain    Iliococcygeus moderate restriction severe restriction and pain    Obturator internus severe restriction severe restriction    Piriformis NT internally NT internally    Coccygeus Not tested Not tested         Pelvic Floor Muscle strength: (PERF= Power/Endurance/Reps/Fast) MMT: unable to test  Accessory Muscle Use:not tested at this time    Tissue Laxity Test: not tested    PFDI-20: not captured    Today's Treatment and Response:     THERAACT X 1 x  10 mins    Patient education was provided on objective findings of external and internal evaluation and expectations with treatment outcomes. Educated on pelvic anatomy and function with diagrams and pelvic model, bladder normatives, and adequate hydration levels,      NEURO KE EXERCISES DONE x 1 x 15 mins  Diaphragmatic breathing is sitting/supine/internal  DB with PFM coordination: external  PFM relaxation in combo with stretches  Supine butterfly 30 secs x 2    Single knee to chest  Patient was instructed in and issued a HEP for: above, copies given    Charges: PT Eval Moderate Complexity, theraact x1. Neuro ke x1      Total Timed Treatment: 25 min     Total Treatment Time: 60 min     Based on clinical rationale and outcome measures, this evaluation involved Moderate Complexity decision making due to 1-2 personal factors/comorbidities, 3 body structures involved/activity limitations, and evolving symptoms as noted above  PLAN OF CARE:    Goals: (to be  met in 12 visits)    Pt will be I with HEP,its progression and management of symptoms, biomechanical strategies and self correction of upright posturing and PFM facilitation to manage IAP with daily tasks  Patient will verbalize improved  understanding of  bladder/bowel habits ,bladder ,bladder retraining  and long term management  Patient will exhibit an increase ability  in isolated pelvic floor strength  with improved  PERF , with appropriate relaxation from  for improve pelvic brace /IAP management with ADLS and exhibit improved/ increased ability  with pelvic floor  relaxation as appropriate to have  satisfactory sexual activities.  Patient reports change in [place] stool #3 to #4 with daily bowel moment without straining   Pt will report decreased nocturia incidents  to 0-1 x a night to promote better sleeping pattern  Pt will demonstrate biomechanical strategies for managing IAP for safe performance of  daily, recreational  and work tasks, 75% of the time to avoid incontinence issues,   Pt will demonstrate decreased muscular tone/tenderness on PFM to minimal to  none in order to safely progress with PFM strengthening and stabilization as appropriate  and participate in sexual activities with minimal pain during and after     Frequency / Duration: Patient will be seen for 1 x/week or a total of 12 visits over a 90 day period.  Treatment will include: Manual Therapy, Neuromuscular Re-education, Self-Care Home Management, Therapeutic Activities, Therapeutic Exercise, and Home Exercise Program instruction       Education or treatment limitation: None  Rehab Potential:good      Patient/Family/Caregiver was advised of these findings, precautions, and treatment options and has agreed to actively participate in planning and for this course of care.    Thank you for your referral. Please co-sign or sign and return this letter via fax as soon as possible to [phone]. If you have any questions, please contact me at  Dept: [phone]    Sincerely,  Electronically signed by therapist: Shirley Chavez,PT, DPT, CAPP-OB  Physician's certification required: Yes  I certify the need for these services furnished under this plan of treatment and while under my care.    X___________________________________________________ Date____________________    Certification From: 10/1/24  To:12/30/2024

## 2024-09-30 ENCOUNTER — TELEPHONE (OUTPATIENT)
Dept: PHYSICAL THERAPY | Facility: HOSPITAL | Age: 29
End: 2024-09-30

## 2024-10-01 ENCOUNTER — OFFICE VISIT (OUTPATIENT)
Dept: PHYSICAL THERAPY | Age: 29
End: 2024-10-01
Attending: OBSTETRICS & GYNECOLOGY
Payer: COMMERCIAL

## 2024-10-01 DIAGNOSIS — M62.89 PELVIC FLOOR DYSFUNCTION: Primary | ICD-10-CM

## 2024-10-01 PROCEDURE — 97162 PT EVAL MOD COMPLEX 30 MIN: CPT

## 2024-10-01 PROCEDURE — 97112 NEUROMUSCULAR REEDUCATION: CPT

## 2024-10-01 PROCEDURE — 97530 THERAPEUTIC ACTIVITIES: CPT

## 2024-10-04 NOTE — PROGRESS NOTES
Dx: Pelvic floor dysfunction (M62.89)                Insurance   PPO           Authorized  # visits by insurance  :  10/1/24    Expiration date  of Authorization:12/30/24   Eval date/latest PN:10/1/24  Initial POC# of visits: 12           POC cert date : 12/30/24  Authorizing Physician: Dr. Rosas    Fall Risk: standard         Precautions: none         Language line: [place] Pearson [id number]   Subjective: pt states that she has no change from last session , okay to to internal. She relates that she had some pain and feet got inflamed in this area.pt states that she drank less fluids.  PAIN LEVEL:3/10  Assessment: global assessment done, pain with lumbar motions and decreased LE strength noted with tenderness on adductors, hip area and paralumbars  New goals made for LE strength  PT continued with PFM release in all layers and worked on PFM contractions and relaxation, discussed using lubricant for sexual   Informed consent for internal pelvic evaluation given: Yes      Objective:     PELVIC HEALTH  Posture : WFL  Range Of Motion  Lumbar AROM screen: WFL but painful in all planes    Palpation:     Adductors :moderate restriction   Greater trochanter/Gluteal area :moderate restriction    Piriformis :moderate restriction   Paravertebrals: moderate restriction     LE AROM screen: grossly WNL for B hip and knee major joints  painul with hip abd  Strength (MMT) 4/5 MARY LE  grossly assessed except hip extension 3+/5 pain with movement      Goals:   goals addressed this day as noted above  Goals: (to be met in 12 visits)     Pt will be I with HEP,its progression and management of symptoms, biomechanical strategies and self correction of upright posturing and PFM facilitation to manage IAP with daily tasks  Patient will verbalize improved  understanding of  bladder/bowel habits ,bladder ,bladder retraining  and long term management  Patient will exhibit an increase ability  in isolated pelvic floor strength  with improved   PERF , with appropriate relaxation from  for improve pelvic brace /IAP management with ADLS and exhibit improved/ increased ability  with pelvic floor  relaxation as appropriate to have  satisfactory sexual activities.  Patient reports change in [place] stool #3 to #4 with daily bowel moment without straining   Pt will report decreased nocturia incidents  to 0-1 x a night to promote better sleeping pattern  Pt will demonstrate biomechanical strategies for managing IAP for safe performance of  daily, recreational  and work tasks, 75% of the time to avoid incontinence issues,   Pt will demonstrate decreased muscular tone/tenderness on PFM to minimal to  none in order to safely progress with PFM strengthening and stabilization as appropriate  and participate in sexual activities with minimal pain during and after   Pt will improve strength on BLE graded below 5/5 to at least half a grade or better for ease of managing IAP with daily tasks    Pt will have decreased mm tension/hypertonicity on lumbopelvic hip/global musculature  to minimal to none in order to        Plan:cont per POC, will assess previous treatment and continue with relaxation,     Date: 10/8/24  TX#: 2/12 Date:               TX#: 3/ Date:              TX#: 4/ Date:               TX#: 5/   Date:   Tx#: 6/  PFDI   Theraex: X10 mins  - Child pose 30 x2  - Supine butterfly stretch 30 x2   -SKTC 30 x2  Global assessment         Manual:   X25 mins  Informed consent for internal pelvic evaluation given: Yes    PFM :   MFR/internal vaginal work  Layers 1-3 focused on 1-3           NMRed:  see below X8 mins         INTERNAL RELAXATION :intervaginal       Diaphragmatic breathing x5 reps    Diaphragmatic breathing with PFM coordination x5     Intervaginal Neuro ke: long hold contractions x 3 secs then relax            EXTERNAL DB in supine x5         Education: PT provided/reviewed education on diaphragmatic breathing for PNS activation and pelvic floor  relaxation  with hand outs given        HEP See pt instructions tab for new HEP today See pt instructions tab for new HEP today See pt instructions tab for new HEP today See pt instructions tab for new HEP today See pt instructions tab for new HEP today        Charges: man mob x 2, theraex x1. Kiley dempsey x1       Total Timed Treatment: 43 min  Total Treatment Time: 43 min

## 2024-10-08 ENCOUNTER — OFFICE VISIT (OUTPATIENT)
Dept: PHYSICAL THERAPY | Age: 29
End: 2024-10-08
Attending: OBSTETRICS & GYNECOLOGY
Payer: COMMERCIAL

## 2024-10-08 ENCOUNTER — TELEPHONE (OUTPATIENT)
Dept: PHYSICAL THERAPY | Age: 29
End: 2024-10-08

## 2024-10-08 PROCEDURE — 97112 NEUROMUSCULAR REEDUCATION: CPT

## 2024-10-08 PROCEDURE — 97110 THERAPEUTIC EXERCISES: CPT

## 2024-10-08 PROCEDURE — 97140 MANUAL THERAPY 1/> REGIONS: CPT

## 2024-10-15 NOTE — PROGRESS NOTES
Dx: Pelvic floor dysfunction (M62.89)                Insurance   PPO           Authorized  # visits by insurance  :  10/1/24    Expiration date  of Authorization:12/30/24   Eval date/latest PN:10/1/24  Initial POC# of visits: 12           POC cert date : 12/30/24  Authorizing Physician: Dr. Rosas    Fall Risk: standard         Precautions: none         Language line: Grant  [id number]  Subjective: pt states that she is doing better. She states that she is still having issues on the pelvic area. Pt states that she still has pain with sexual  intercourse. Still pain with inner leg. She does better with constipation every other day. Pt states that she has been urinating every 3-4 hrs, no urgency    PAIN LEVEL:3/10  Assessment:     Pt states that she has to be out of the [place] starting this weekend and  has to put PT on hold for 2 mos. Discussed needing anew order   IRD in 3 level at 2 finger widths  Core strength at 3/5  Continued with PFM release and discussed self management with index finger or option of pelvic wand  Informed consent for internal pelvic evaluation given: Yes      Objective:     Transverse Abdominis: 3/5      Diastasis Recti: (finger width depth while contracted)  Above Umbilicus: 2  Umbilicus: 2  Below Umbilicus: 2      Goals:   goals addressed this day as noted above  Goals: (to be met in 12 visits)     Pt will be I with HEP,its progression and management of symptoms, biomechanical strategies and self correction of upright posturing and PFM facilitation to manage IAP with daily tasks  Patient will verbalize improved  understanding of  bladder/bowel habits ,bladder ,bladder retraining  and long term management  Patient will exhibit an increase ability  in isolated pelvic floor strength  with improved  PERF , with appropriate relaxation from  for improve pelvic brace /IAP management with ADLS and exhibit improved/ increased ability  with pelvic floor  relaxation as appropriate to have   satisfactory sexual activities.  Patient reports change in [place] stool #3 to #4 with daily bowel moment without straining   Pt will report decreased nocturia incidents  to 0-1 x a night to promote better sleeping pattern  Pt will demonstrate biomechanical strategies for managing IAP for safe performance of  daily, recreational  and work tasks, 75% of the time to avoid incontinence issues,   Pt will demonstrate decreased muscular tone/tenderness on PFM to minimal to  none in order to safely progress with PFM strengthening and stabilization as appropriate  and participate in sexual activities with minimal pain during and after   Pt will improve strength on BLE graded below 5/5 to at least half a grade or better for ease of managing IAP with daily tasks    Pt will have decreased mm tension/hypertonicity on lumbopelvic hip/global musculature  to minimal to none in order to        Plan:pt will be out of country of 2 mos and will need new order to resume, pt aware   Date: 10/8/24  TX#: 2/12 Date:       10/16/24        TX#: 3/ Date:              TX#: 4/ Date:               TX#: 5/   Date:   Tx#: 6/  PFDI   Theraex: X10 mins  - Child pose 30 x2  - Supine butterfly stretch 30 x2   -SKTC 30 x2  Global assessment   X12 mins  Supine butterfly stretch 30 x 3  - piriformis 30 x3  Child pose x 30 x2  Upright bike level 0 x 6 mins        Manual:   X25 mins  Informed consent for internal pelvic evaluation given: Yes    PFM :   MFR/internal vaginal work  Layers 1-3 focused on 1-3     X23 mins    Informed consent for internal pelvic evaluation given: Yes    PFM :   MFR/internal vaginal work  Layers 1-3 focused on 1-3        NMRed:  see below X8 mins   X 8 mins        INTERNAL RELAXATION :intervaginal       Diaphragmatic breathing x5 reps    Diaphragmatic breathing with PFM coordination x5     Intervaginal Neuro ke: long hold contractions x 3 secs then relax      RELAXATION :intervaginal       Diaphragmatic breathing x5  reps    Diaphragmatic breathing with PFM coordination x5     Intervaginal Neuro ke: long hold contractions x 3 secs then relax        EXTERNAL DB in supine x5         Education: PT provided/reviewed education on diaphragmatic breathing for PNS activation and pelvic floor relaxation  with hand outs given        HEP See pt instructions tab for new HEP today See pt instructions tab for new HEP today See pt instructions tab for new HEP today See pt instructions tab for new HEP today See pt instructions tab for new HEP today        Charges: man mob x 2, theraex x1. [place] ke x1       Total Timed Treatment: 43 min  Total Treatment Time: 43 min

## 2024-10-16 ENCOUNTER — OFFICE VISIT (OUTPATIENT)
Dept: PHYSICAL THERAPY | Age: 29
End: 2024-10-16
Attending: OBSTETRICS & GYNECOLOGY
Payer: COMMERCIAL

## 2024-10-16 PROCEDURE — 97110 THERAPEUTIC EXERCISES: CPT

## 2024-10-16 PROCEDURE — 97140 MANUAL THERAPY 1/> REGIONS: CPT

## 2024-10-16 PROCEDURE — 97112 NEUROMUSCULAR REEDUCATION: CPT

## 2024-10-23 ENCOUNTER — APPOINTMENT (OUTPATIENT)
Dept: PHYSICAL THERAPY | Age: 29
End: 2024-10-23
Attending: OBSTETRICS & GYNECOLOGY
Payer: COMMERCIAL

## 2024-10-30 ENCOUNTER — APPOINTMENT (OUTPATIENT)
Dept: PHYSICAL THERAPY | Age: 29
End: 2024-10-30
Attending: OBSTETRICS & GYNECOLOGY
Payer: COMMERCIAL

## 2024-11-06 ENCOUNTER — APPOINTMENT (OUTPATIENT)
Dept: PHYSICAL THERAPY | Age: 29
End: 2024-11-06
Attending: OBSTETRICS & GYNECOLOGY
Payer: COMMERCIAL

## 2024-11-13 ENCOUNTER — APPOINTMENT (OUTPATIENT)
Dept: PHYSICAL THERAPY | Age: 29
End: 2024-11-13
Attending: OBSTETRICS & GYNECOLOGY
Payer: COMMERCIAL

## 2024-11-20 ENCOUNTER — APPOINTMENT (OUTPATIENT)
Dept: PHYSICAL THERAPY | Age: 29
End: 2024-11-20
Attending: OBSTETRICS & GYNECOLOGY
Payer: COMMERCIAL

## (undated) DIAGNOSIS — M79.603 ARM PAIN: Primary | ICD-10-CM

## (undated) NOTE — LETTER
12/12/2023              Shandra Delgado        [address] [address]           To whom it may concern,      This letter is to certify that it is safe for the above named patient to fly. If you need additional information please contact our office.        Sincerely,    Marcelino Alonso PA-C  [facility], [address]  [address]  [phone]

## (undated) NOTE — LETTER
[facility]   Administracion de Anestesia  Yo, Shandra Sheila Ernestina Rachid, acepto ser atendido por un anestesiólogo, quien está especialmente capacitado para monitorearme y darme medicamento para hacerme dormir o mantenerme cómodo chiara mi procedimiento.   Entiendo que mi anestesiólogo no es un empleado o agente de [facility]. Él o zohaib trabaja para [facility]  [place] el paciente que solicita los servicios de anestesia, estoy de acuerdo con lo siguiente:  Permitir al anestesiólogo (médico de anestesia) que me suministre el medicamento y hacer los procedimientos adicionales que avtar necesarios. Algunos ejemplos son: Al iniciar o utilizar martin “IV” para suministrarme medicamentos, fluidos o jamaal chiara mi procedimiento, y colocarme martin sonda de respiración, me ayudará a respirar cuando esté dormido (intubación). En el jose m de que mi corazón deje de funcionar correctamente, entiendo que mi anestesiólogo hará todo lo posible para salvar mi catalina, a menos que los documentos de No resucitar de [facility] lo indiquen de otra manera.  Informar a mi anestesista antes del procedimiento:  Si estoy embarazada.  La última vez que comí o bebí algo.  Todos los medicamentos que christa (incluyendo medicamentos recetados, suplementos herbales y pastillas que puedo comprar sin receta médica (incluyendo drogas en la palmer [medicamentos ilegales]). No informar a mi anestesiólogo acerca de estos medicamentos puede aumentar mi riesgo de complicaciones con la anestesia.  Si soy alérgico a cualquier cosa o he tenido previamente martin reacción adversa a la anestesia.  Entiendo cómo la anestesia me ayudará (beneficios).  Entiendo que con cualquier tipo de anestesia hay riesgos:  Los riesgos más comunes son: náuseas, vómitos, dolor de garganta, dolor muscular, daño a los ojos, la boca o los dientes (por la colocación de la sonda de respiración).  Los riesgos poco comunes  incluyen: recordar lo que sucedió chiara mi procedimiento, reacciones alérgicas a los medicamentos, lesiones en las vías respiratorias, el corazón, los pulmones, la visión, los nervios o músculos, y en casos sumamente raros, la muerte.  Mii médico me ha explicado otras opciones de atención disponibles para mí (alternativas).  Pacientes embarazadas (“epidural”):    Entiendo que los riesgos de recibir martin inyección epidural (medicamento que se aplica en la espalda para ayudar a controlar el dolor chiara el parto), incluyen picazón, presión arterial baja, dificultad para orinar, dolor de lizzette o disminución en el ritmo del corazón del bebé. Los riesgos muy poco comunes incluyen infección, hemorragia, convulsiones, ritmo cardíaco irregular y lesión del nervio.  Anestesia regional (“columna vertebral”, “epidural” y “bloqueo de los nervios”):  Entiendo que hay complicaciones poco frecuentes fabrizio posibles, e incluyen dolor de lizzette, sangrado, infección, convulsiones, ritmo cardíaco irregular y la lesión del nervio.  .   Puedo cambiar de opinión acerca de recibir servicios de anestesia en cualquier momento antes de que se me administre el medicamento.         Paciente (o representante) Firma/Relación con el paciente  Fecha  Hora  Patient Signature/Signature of Responsible person Date Time           Nombre del intérprete (en smith jose m)  Idioma/Organización  Hora  Name of  Language/Organization Time          Firma del anestesiólogo Fecha  Hora  Signature of anesthesiologist Date Time    He hablado del procedimiento y la información anterior con el paciente (o el representante del paciente) y respondí jaycob preguntas. El paciente o smith representante ha aceptado recibir los servicios de anestesia.         Testozzie Fecha  Hora  Witness Date Time  He verificado que la firma es la del paciente o del representante del paciente, y que se firmó antes del procedimiento.    Nombre del paciente: Shandra Yarbrougha Ernestina Rachid   Fec. de Nac.: 8/14/1995                 En letra de imprenta: April 5, 2024  Expediente médico No. [id number]                         Página 1 de 2  ----------ANESTHESIA CONSENT----------

## (undated) NOTE — LETTER
2024              Shandra Rashid        [address] [facility] [address]         Re: Shandra Rashid  : 1995    To Whom It May Concern:  Shandra Rashid is under my care for pregnancy with an Estimated Date of Delivery: 24. Please consider allowing family to travel to help support my patient after delivery. If you have any questions concerning this letter, please feel free to contact my office.        Sincerely,    NICOLASA WILLSON PA-C  [facility] - OB/GYN  [address]  [facility] [phone]

## (undated) NOTE — LETTER
[facility]  [address] [facility] [address]  [phone]    Consentimiento para la transfusión de jamaal    En el curso de smith tratamiento, puede ser necesario administrarle martin transfusión de jamaal o componentes de la jamaal. Tameka formulario da la información básica relacionada a tameka procedimiento y, si está firmado por cydney, autoriza smith administración.  Firmando tameka formulario, ted acepta que el profesional médico que ha solicitado o la persona designada davidson respondido a todas jaycob preguntas sobre la administración de la jamaal o de los productos de la jamaal.    Descripción del procedimiento  La jamaal se introduce en martin de jaycob venas, habitualmente en el brazo, mediante martin aguja esterilizada desechable. La cantidad de jamaal transfundida, y si la transfusión será de jamaal o de componentes de la jamaal, es martin decisión que tomará el médico basándose en jaycob necesidades particulares.    Riesgos  La transfusión es un procedimiento habitual de bajo riesgo.  CON FRECUENCIA HAY REACCIONES LEVES Y TEMPORALES, incluyendo un ligero hematoma, hinchazón o reacción local en el área donde la aguja atraviesa la piel, o martin reacción no grave al propio material transfundido, incluyendo el dolor de lizzette, fiebre o martin reacción leve en la piel, florentino un sarpullido.  Las reacciones graves son posibles, aunque poco probables, e incluyen martin reacción alérgica grave (choque) y la destrucción (hemólisis) de las células sanguíneas transfundidas.  Entre las enfermedades infecciosas que se sabe que se transmiten por la transfusión de jamaal, se incluyen ciertos tipos de hepatitis viral (infección del hígado por un virus), la infección por el virus de la inmunodeficiencia humana (VIH-1,2), martin infección viral conocida por causar el síndrome de inmunodeficiencia adquirida (SIDA) y ciertas enfermedades bacterianas, virales y parasitarias. Aunque existe un riesgo mínimo de contraer martin enfermedad  infecciosa por medio de la jamaal transfundida, según la [employer] y estatal, se lane tomado todas las atenciones necesarias en la selección y análisis de los donantes para evitar la transmisión de enfermedades.    Alternativas  Si la pérdida de jamaal supone martin amenaza grave chiara smith tratamiento, NO EXISTE ALTERNATIVA EFICAZ A LA TRANSFUSIÓN DE JAMAAL. Sin embargo, si tiene más preguntas al respecto, smith proveedor le explicará detalladamente las alternativas si aún no lo davidson hecho.    Yo, _____________________, he leído/hecho que me lean lo de arriba. Entiendo las cuestiones que influyen en la decisión de autorizar o no martin transfusión de jamaal o componentes de la jamaal. No tengo ninguna pregunta que no se haya respondido a mi entera satisfacción. Por la presente doy mi consentimiento para la transfusión que mi médico considere necesaria o aconsejable en el curso de mi tratamiento.      (Firma del paciente o de la persona responsable si es woody de edad,  paciente incompetente o inconsciente).  (Nombre del paciente o de la persona responsable en letra de molde).       (Relación con el paciente si no es él mismo).  (Fecha y hora).        (Firma del testigo).  (Nombre del testigo en letra de molde).     Language line (intérprete)   Consentimiento telefónico/verbal/en video    _________________________        _________________________  (Firma del keri testigo         (Nombre en letra de molde del   consentimiento telefónico/verbal/en video)       keri testigo)           Patient Name: Shandra Do Rachid     : 1995                 Printed: 2024     Medical Record #: [id number]    Rev: 2023

## (undated) NOTE — MR AVS SNAPSHOT
After Visit Summary   18/13/5849    Shandra Pablo   MRN: [id number]           Visit Information     Date & Time  11/29/2021  2:20 PM Provider  Pramod Herr MD Department  [address] [facility] [facility] [address]  Phone  [phone] send messages to your doctor, view test results, renew prescriptions and request appointments. How Do I Sign Up? 1. In your Internet browser, go to [email or web address]  2.  Click on the Activate your Account if you have an activation c us how to get signed up today! If you receive a survey from [id number], please take a few minutes to complete it and provide feedback. We strive to deliver the best patient experience and are looking for ways to make improvements.  Your feedback bahman

## (undated) NOTE — LETTER
VACCINE ADMINISTRATION RECORD  PARENT / GUARDIAN APPROVAL  Date: 2024  Vaccine administered to: Shandra Rashid     : 1995    MRN: [id number]    A copy of the appropriate Centers for Disease Control and Prevention Vaccine Information statement has been provided. I have read or have had explained the information about the diseases and the vaccines listed below. There was an opportunity to ask questions and any questions were answered satisfactorily. I believe that I understand the benefits and risks of the vaccine cited and ask that the vaccine(s) listed below be given to me or to the person named above (for whom I am authorized to make this request).    VACCINES ADMINISTERED:  Tdap    I have read and hereby agree to be bound by the terms of this agreement as stated above. My signature is valid until revoked by me in writing.  This document is signed by Self, relationship: Self on 2024.:                                                                                                                                         Parent / Guardian Signature                                                Date    Dolores FOREMAN CMA served as a witness to authentication that the identity of the person signing electronically is in fact the person represented as signing.

## (undated) NOTE — MR AVS SNAPSHOT
After Visit Summary   2024    Shandra Rashid   MRN: [id number]           Visit Information     Date & Time  2024  3:00 PM Provider  Massimo Heard MD [facility] - OB/GYN Dept. Phone  [phone]      Your Vitals Were  Most recent update: 2024  3:09 PM    BP   109/74          Pulse   63          Ht   64\"          Wt   130 lb          LMP   05/10/2023 (Exact Date)             Breastfeeding   Yes    BMI   22.31 kg/m²         Allergies as of 2024  Review status set to Review Complete on 2024   No Known Allergies     Your Current Medications        Dosage    docusate sodium 50 MG Oral Cap Take 1 capsule (50 mg total) by mouth 2 (two) times daily.    acetaminophen 500 MG Oral Tab Take 1 tablet (500 mg total) by mouth every 6 (six) hours as needed for Pain.    ibuprofen 600 MG Oral Tab Take 1 tablet (600 mg total) by mouth every 6 (six) hours.    Prenatal Vit-DSS-Fe Fum-FA (SE- 19) 29-1 MG Oral Tab     Ferrous Sulfate 325 (65 Fe) MG Oral Tab Take 1 tablet (325 mg total) by mouth every other day.    Prenatal Vit-Fe Fumarate-FA (M- PLUS) 27-1 MG Oral Tab Take 1 tablet by mouth daily.    cetirizine 1 MG/ML Oral Solution Take 5 mL (5 mg total) by mouth daily.    medroxyPROGESTERone Acetate (PROVERA) 10 MG Oral Tab Take 1 tablet by mouth for 10 days    DULoxetine 60 MG Oral Cap DR Particles Take 1 capsule (60 mg total) by mouth daily.    omeprazole 20 MG Oral Capsule Delayed Release Take 1 capsule (20 mg total) by mouth every morning before breakfast.    Inulin (FIBER CHOICE OR) Take by mouth.      Diagnoses for This Visit    Normal gynecologic examination   [6300235]  -  Primary  Pelvic pain   [875623]             Follow-up    Return in about 1 year (around 2025) for Annual exam.     We Ordered the Following     Normal Orders This Visit    Chlamydia/Gc Amplification [[id number] CUSTOM]     Physical Therapy  Referral - [place] Locations [[id number] CUSTOM]     ThinPrep PAP with HPV Reflex Request [[id number] CUSTOM]     ThinPrep Pap with HPV Reflex, Chlamydia/GC [[id number] CUSTOM]     Future Labs/Procedures Expected by Expires    Chlamydia/Gc Amplification [[id number] CUSTOM]  6/26/2024 (Approximate) 6/26/2025    ThinPrep Pap with HPV Reflex, Chlamydia/GC [[id number] CUSTOM]  6/26/2024 6/26/2025    XR HIP + PELVIS MIN 4 VIEWS LEFT (CPT=73503) [17105 CPT(R)]  6/26/2024 (Approximate) 6/26/2025      Follow-up Instructions    Return in about 1 year (around 6/26/2025) for Annual exam.     Imaging Scheduling Instructions     Around June 26, 2024   Imaging:   XR HIP + PELVIS MIN 4 VIEWS LEFT (CPT=73503)    Instructions: Your order will generate a \"Scheduling Ticket\" that will be available in [facility] to schedule on your own at a time most convenient to you.      If you do not have a [facility] Account, or if you prefer to speak with someone to schedule your appointment, please call [employer] [facility] Scheduling at [phone].                    Did you know that [facility] now offer Video Visits through [facility] for adult patients for a variety of conditions such as allergies, back pain and cold symptoms? Skip the drive and waiting room and online chat with a doctor face-to-face using your web-cam enabled computer or mobile device wherever you are. Video Visits cost $50 and can be paid hassle-free using a credit, debit, or health savings card.  Not active on [employer] [facility]? Ask us how to get signed up today!          If you receive a survey from [employer], please take a few minutes to complete it and provide feedback. We strive to deliver the best patient experience and are looking for ways to make improvements. Your feedback will help us do so. For more information on [employer], please visit [email or web address]           No text in SmartText           No text in SmartText